# Patient Record
Sex: MALE | Race: WHITE | Employment: OTHER | URBAN - METROPOLITAN AREA
[De-identification: names, ages, dates, MRNs, and addresses within clinical notes are randomized per-mention and may not be internally consistent; named-entity substitution may affect disease eponyms.]

---

## 2017-03-03 RX ORDER — AMLODIPINE BESYLATE 10 MG/1
TABLET ORAL
Qty: 30 TABLET | Refills: 0 | Status: SHIPPED | OUTPATIENT
Start: 2017-03-03 | End: 2017-04-04

## 2017-03-05 RX ORDER — CITALOPRAM 10 MG/1
TABLET ORAL
Qty: 90 TABLET | Refills: 0 | Status: SHIPPED | OUTPATIENT
Start: 2017-03-05 | End: 2017-06-06

## 2017-03-17 NOTE — TELEPHONE ENCOUNTER
Patient contacted and advised to do standing order lab work and schedule an office visit. He is checking with his ride. WILLIAM---please assist patient in scheduling an appointment using SDS or RN approval. He needs a Wednesday and I did offer him 3/22/17.  He

## 2017-03-25 ENCOUNTER — LAB ENCOUNTER (OUTPATIENT)
Dept: LAB | Age: 37
End: 2017-03-25
Attending: INTERNAL MEDICINE
Payer: MEDICARE

## 2017-03-25 DIAGNOSIS — N18.4 CKD (CHRONIC KIDNEY DISEASE), STAGE 4 (SEVERE): ICD-10-CM

## 2017-03-25 LAB
ALBUMIN SERPL BCP-MCNC: 3.6 G/DL (ref 3.5–4.8)
ANION GAP SERPL CALC-SCNC: 11 MMOL/L (ref 0–18)
BASOPHILS # BLD: 0 K/UL (ref 0–0.2)
BASOPHILS NFR BLD: 0 %
BUN SERPL-MCNC: 61 MG/DL (ref 8–20)
BUN/CREAT SERPL: 10.7 (ref 10–20)
CALCIUM SERPL-MCNC: 9.2 MG/DL (ref 8.5–10.5)
CHLORIDE SERPL-SCNC: 106 MMOL/L (ref 95–110)
CO2 SERPL-SCNC: 21 MMOL/L (ref 22–32)
CREAT SERPL-MCNC: 5.7 MG/DL (ref 0.5–1.5)
EOSINOPHIL # BLD: 0.3 K/UL (ref 0–0.7)
EOSINOPHIL NFR BLD: 3 %
ERYTHROCYTE [DISTWIDTH] IN BLOOD BY AUTOMATED COUNT: 13.8 % (ref 11–15)
GLUCOSE SERPL-MCNC: 94 MG/DL (ref 70–99)
HCT VFR BLD AUTO: 35.6 % (ref 41–52)
HGB BLD-MCNC: 11.9 G/DL (ref 13.5–17.5)
LYMPHOCYTES # BLD: 1.8 K/UL (ref 1–4)
LYMPHOCYTES NFR BLD: 16 %
MCH RBC QN AUTO: 30.8 PG (ref 27–32)
MCHC RBC AUTO-ENTMCNC: 33.5 G/DL (ref 32–37)
MCV RBC AUTO: 91.8 FL (ref 80–100)
MONOCYTES # BLD: 1 K/UL (ref 0–1)
MONOCYTES NFR BLD: 9 %
NEUTROPHILS # BLD AUTO: 8 K/UL (ref 1.8–7.7)
NEUTROPHILS NFR BLD: 71 %
OSMOLALITY UR CALC.SUM OF ELEC: 303 MOSM/KG (ref 275–295)
PHOSPHATE SERPL-MCNC: 4.7 MG/DL (ref 2.4–4.7)
PLATELET # BLD AUTO: 245 K/UL (ref 140–400)
PMV BLD AUTO: 7 FL (ref 7.4–10.3)
POTASSIUM SERPL-SCNC: 5.5 MMOL/L (ref 3.3–5.1)
RBC # BLD AUTO: 3.87 M/UL (ref 4.5–5.9)
SODIUM SERPL-SCNC: 138 MMOL/L (ref 136–144)
WBC # BLD AUTO: 11.2 K/UL (ref 4–11)

## 2017-03-25 PROCEDURE — 80069 RENAL FUNCTION PANEL: CPT

## 2017-03-25 PROCEDURE — 36415 COLL VENOUS BLD VENIPUNCTURE: CPT

## 2017-03-25 PROCEDURE — 85025 COMPLETE CBC W/AUTO DIFF WBC: CPT

## 2017-03-29 ENCOUNTER — OFFICE VISIT (OUTPATIENT)
Dept: NEPHROLOGY | Facility: CLINIC | Age: 37
End: 2017-03-29

## 2017-03-29 VITALS
DIASTOLIC BLOOD PRESSURE: 92 MMHG | HEART RATE: 92 BPM | HEIGHT: 70 IN | WEIGHT: 177 LBS | SYSTOLIC BLOOD PRESSURE: 155 MMHG | BODY MASS INDEX: 25.34 KG/M2

## 2017-03-29 DIAGNOSIS — N18.5 CKD (CHRONIC KIDNEY DISEASE), STAGE 5: Primary | ICD-10-CM

## 2017-03-29 DIAGNOSIS — I10 ESSENTIAL HYPERTENSION: ICD-10-CM

## 2017-03-29 PROCEDURE — G0463 HOSPITAL OUTPT CLINIC VISIT: HCPCS | Performed by: INTERNAL MEDICINE

## 2017-03-29 PROCEDURE — 99215 OFFICE O/P EST HI 40 MIN: CPT | Performed by: INTERNAL MEDICINE

## 2017-03-29 RX ORDER — PANTOPRAZOLE SODIUM 40 MG/1
40 TABLET, DELAYED RELEASE ORAL
Qty: 30 TABLET | Refills: 5 | Status: SHIPPED | OUTPATIENT
Start: 2017-03-29 | End: 2017-10-23 | Stop reason: ALTCHOICE

## 2017-03-29 RX ORDER — LORATADINE 10 MG/1
10 TABLET ORAL DAILY
COMMUNITY
End: 2017-10-23 | Stop reason: ALTCHOICE

## 2017-03-29 RX ORDER — AZELASTINE 1 MG/ML
SPRAY, METERED NASAL
COMMUNITY
Start: 2014-06-01 | End: 2017-10-23 | Stop reason: ALTCHOICE

## 2017-04-04 RX ORDER — AMLODIPINE BESYLATE 10 MG/1
TABLET ORAL
Qty: 30 TABLET | Refills: 5 | Status: SHIPPED | OUTPATIENT
Start: 2017-04-04 | End: 2017-10-03

## 2017-04-04 NOTE — PROGRESS NOTES
03/29/2017          Patient:  Des Loja   Date of Birth:  11/6/1980    Date of Visit:  3/29/2017        Dear  Dr. Brittany White,      Thank you for referring Des Loja to my practice.  Please find my assessment and plan below.      As y initiate dialysis if he is unable to complete the transplantation process in a timely fashion.  Strongly advised to follow a low potassium diet.  I reemphasized that he needs to do labs more frequently in order for us to follow him.  Recommended repeating

## 2017-06-06 NOTE — TELEPHONE ENCOUNTER
Refill Protocol Appointment Criteria: Refill protocol failed because the patient did not meet the protocol criteria.  Please advise in regards to refill request     · Appointment scheduled in the past 6 months or in the next 3 months  Recent Visits       Pr

## 2017-06-07 RX ORDER — CITALOPRAM 10 MG/1
TABLET ORAL
Qty: 30 TABLET | Refills: 0 | Status: SHIPPED | OUTPATIENT
Start: 2017-06-07 | End: 2017-07-05

## 2017-06-07 NOTE — TELEPHONE ENCOUNTER
HEIDI MCKEON/[facility] [phone] is calling for status of medication refill request. Per MUKUL pt is out of medication.

## 2017-07-05 RX ORDER — CITALOPRAM 10 MG/1
TABLET ORAL
Qty: 30 TABLET | Refills: 1 | Status: SHIPPED | OUTPATIENT
Start: 2017-07-05 | End: 2017-09-04

## 2017-07-07 NOTE — TELEPHONE ENCOUNTER
Patient is currently seeing specialist, patient will call back to schedule an appointment for august.

## 2017-09-05 RX ORDER — CITALOPRAM 10 MG/1
TABLET ORAL
Qty: 30 TABLET | Refills: 0 | Status: SHIPPED | OUTPATIENT
Start: 2017-09-05 | End: 2017-10-09

## 2017-10-03 DIAGNOSIS — N18.4 CHRONIC KIDNEY DISEASE, STAGE IV (SEVERE) (HCC): Primary | ICD-10-CM

## 2017-10-03 RX ORDER — AMLODIPINE BESYLATE 10 MG/1
TABLET ORAL
Qty: 30 TABLET | Refills: 0 | Status: SHIPPED | OUTPATIENT
Start: 2017-10-03 | End: 2017-11-02

## 2017-10-03 RX ORDER — CITALOPRAM 10 MG/1
TABLET ORAL
Qty: 30 TABLET | Refills: 0 | OUTPATIENT
Start: 2017-10-03

## 2017-10-03 NOTE — TELEPHONE ENCOUNTER
Needs f/u appt.   Refill Protocol Appointment Criteria  · Appointment scheduled in the past 6 months or in the next 3 months  Recent Outpatient Visits            6 months ago CKD (chronic kidney disease), stage 5 [facility])    [address] [place] [address]

## 2017-10-09 RX ORDER — CITALOPRAM 10 MG/1
10 TABLET ORAL
Qty: 30 TABLET | Refills: 0 | Status: SHIPPED | OUTPATIENT
Start: 2017-10-09 | End: 2017-10-23

## 2017-10-09 NOTE — TELEPHONE ENCOUNTER
Pt called to make appt for this Thursday at [address] [place] [address] office at 11:10a. .. please advise

## 2017-10-13 ENCOUNTER — PATIENT OUTREACH (OUTPATIENT)
Dept: FAMILY MEDICINE CLINIC | Facility: CLINIC | Age: 37
End: 2017-10-13

## 2017-10-23 ENCOUNTER — OFFICE VISIT (OUTPATIENT)
Dept: FAMILY MEDICINE CLINIC | Facility: CLINIC | Age: 37
End: 2017-10-23

## 2017-10-23 VITALS
WEIGHT: 171 LBS | SYSTOLIC BLOOD PRESSURE: 131 MMHG | HEIGHT: 70 IN | BODY MASS INDEX: 24.48 KG/M2 | HEART RATE: 94 BPM | DIASTOLIC BLOOD PRESSURE: 85 MMHG

## 2017-10-23 DIAGNOSIS — I10 ESSENTIAL HYPERTENSION: Primary | ICD-10-CM

## 2017-10-23 DIAGNOSIS — N18.4 CHRONIC KIDNEY DISEASE, STAGE IV (SEVERE) (HCC): ICD-10-CM

## 2017-10-23 DIAGNOSIS — F17.200 TOBACCO DEPENDENCE: ICD-10-CM

## 2017-10-23 DIAGNOSIS — Z28.21 IMMUNIZATION NOT CARRIED OUT BECAUSE OF PATIENT REFUSAL: ICD-10-CM

## 2017-10-23 PROCEDURE — 99214 OFFICE O/P EST MOD 30 MIN: CPT | Performed by: FAMILY MEDICINE

## 2017-10-23 PROCEDURE — G0463 HOSPITAL OUTPT CLINIC VISIT: HCPCS | Performed by: FAMILY MEDICINE

## 2017-10-23 RX ORDER — VARENICLINE TARTRATE 1 MG/1
1 TABLET, FILM COATED ORAL 2 TIMES DAILY
Qty: 60 TABLET | Refills: 3 | Status: SHIPPED | OUTPATIENT
Start: 2017-10-23 | End: 2018-03-03

## 2017-10-23 RX ORDER — CITALOPRAM 10 MG/1
10 TABLET ORAL
Qty: 90 TABLET | Refills: 1 | Status: SHIPPED | OUTPATIENT
Start: 2017-10-23 | End: 2018-05-04

## 2017-10-23 NOTE — PROGRESS NOTES
Hasn't gotten on the list for replacement  Multiple calls and hasn't gotten a call back  Stage 5 esrd   No swelling   No encephalopathy  Same vision issues. Tobacco    Needs repeat blood and referral to RAFAELA Daniels living donor.       Patient's past medi Quit on chantix beofre   Recommended quitting prior to transplant.

## 2017-11-02 RX ORDER — AMLODIPINE BESYLATE 10 MG/1
TABLET ORAL
Qty: 30 TABLET | Refills: 0 | Status: SHIPPED | OUTPATIENT
Start: 2017-11-02 | End: 2017-12-03

## 2017-11-06 RX ORDER — CITALOPRAM 10 MG/1
TABLET ORAL
Qty: 30 TABLET | Refills: 0 | OUTPATIENT
Start: 2017-11-06

## 2017-11-06 NOTE — TELEPHONE ENCOUNTER
Refill Protocol Appointment Criteria  · Appointment scheduled in the past 6 months or in the next 3 months  Recent Outpatient Visits            2 weeks ago Essential hypertension    Charlotte Jacobs, Marilyn Santillan, DO    Office V

## 2017-11-07 NOTE — TELEPHONE ENCOUNTER
Patient has not returned our calls. Lab orders printed and mailed to patient with a note attached to schedule an office visit.

## 2017-11-07 NOTE — TELEPHONE ENCOUNTER
Patient has not returned our calls. Lab orders printed and mailed to patient with a note attached to do lab work and schedule an office visit with Dr. Scott Pimentel.

## 2017-12-04 RX ORDER — AMLODIPINE BESYLATE 10 MG/1
TABLET ORAL
Qty: 30 TABLET | Refills: 0 | Status: SHIPPED | OUTPATIENT
Start: 2017-12-04 | End: 2018-01-02

## 2017-12-06 ENCOUNTER — TELEPHONE (OUTPATIENT)
Dept: FAMILY MEDICINE CLINIC | Facility: CLINIC | Age: 37
End: 2017-12-06

## 2017-12-06 NOTE — TELEPHONE ENCOUNTER
Received via fax forms for Chantix, sent by patient. Patient is requesting forms be completed to get Chantix at a lower price. Placed in 's file at [facility].

## 2017-12-07 NOTE — TELEPHONE ENCOUNTER
Form placed on [facility] ADO inbox. Aia 16 is currently out of the office.

## 2017-12-08 NOTE — TELEPHONE ENCOUNTER
Patient contacted. Advised of refill and lab work ordered by Dr. Suma Vargas. Patient is aware to schedule an office visit after lab work is completed.

## 2017-12-12 NOTE — TELEPHONE ENCOUNTER
Forms were completed by [facility], faxed to [phone] by Rosalina Bautista at [facility].

## 2017-12-22 ENCOUNTER — TELEPHONE (OUTPATIENT)
Dept: FAMILY MEDICINE CLINIC | Facility: CLINIC | Age: 37
End: 2017-12-22

## 2017-12-22 NOTE — TELEPHONE ENCOUNTER
Received Chantix 1mg, 56tabs, NDC# [id number],   Lot [id number], exp 02/2020. Placed at [facility]       LM informing patient medication ready for  at [address] [place] [address] [place] office.

## 2018-01-02 RX ORDER — AMLODIPINE BESYLATE 10 MG/1
TABLET ORAL
Qty: 30 TABLET | Refills: 0 | Status: SHIPPED | OUTPATIENT
Start: 2018-01-02 | End: 2018-02-03

## 2018-01-02 NOTE — TELEPHONE ENCOUNTER
Missed order letter with Lab orders enclosed mailed to patient with a note attached to schedule an office visit in order for Dr. Alicia Snow to continue to refill prescriptions.

## 2018-02-05 RX ORDER — AMLODIPINE BESYLATE 10 MG/1
TABLET ORAL
Qty: 14 TABLET | Refills: 0 | Status: SHIPPED | OUTPATIENT
Start: 2018-02-05 | End: 2018-02-17

## 2018-02-05 NOTE — TELEPHONE ENCOUNTER
LOV 3/29/17. No upcoming appts scheduled. Pt has been contacted to schedule an appt and also mailed a letter last month. 2 week supply of med pended.

## 2018-02-19 RX ORDER — AMLODIPINE BESYLATE 10 MG/1
TABLET ORAL
Qty: 14 TABLET | Refills: 0 | Status: SHIPPED | OUTPATIENT
Start: 2018-02-19 | End: 2018-03-03

## 2018-02-19 NOTE — TELEPHONE ENCOUNTER
LOV 3/29/17. No upcoming appts scheduled. I left him a message on 2/5/18 reminding him to do labs as ordered by MKK and to schedule an appointment. A 2 week supply was sent on 2/5/18 and patient has yet to comply and make an appointment or do his labs.

## 2018-02-20 NOTE — TELEPHONE ENCOUNTER
Contacted pt. Notified him MKK needs to see him for follow up and to do labs as ordered. He scheduled follow up for 3/20/18.

## 2018-03-02 NOTE — TELEPHONE ENCOUNTER
Pt requesting refill   Pt would like call back when ready for   Varenicline Tartrate (CHANTIX) 1 MG Oral Tab

## 2018-03-03 NOTE — TELEPHONE ENCOUNTER
[facility]: please advise on further refills.      Refill Protocol Appointment Criteria  · Appointment scheduled in the past 6 months or in the next 3 months  Recent Outpatient Visits            4 months ago Essential hypertension    [address]

## 2018-03-05 RX ORDER — AMLODIPINE BESYLATE 10 MG/1
TABLET ORAL
Qty: 30 TABLET | Refills: 0 | Status: SHIPPED | OUTPATIENT
Start: 2018-03-05 | End: 2018-04-02

## 2018-03-06 RX ORDER — VARENICLINE TARTRATE 1 MG/1
1 TABLET, FILM COATED ORAL 2 TIMES DAILY
Qty: 60 TABLET | Refills: 3 | Status: ON HOLD | OUTPATIENT
Start: 2018-03-06 | End: 2018-04-30

## 2018-04-02 RX ORDER — AMLODIPINE BESYLATE 10 MG/1
TABLET ORAL
Qty: 30 TABLET | Refills: 0 | Status: ON HOLD | OUTPATIENT
Start: 2018-04-02 | End: 2018-05-02

## 2018-04-02 NOTE — TELEPHONE ENCOUNTER
LOV 3/29/17. Patient cancelled 3/20/18 appointment and has not rescheduled. Refill pended and routed to Dr. Keisha Higgins to approve.

## 2018-04-03 NOTE — TELEPHONE ENCOUNTER
Left pt message that [facility]KK sent #30 of his medication but he will need to be seen for further refills since now it's been > 1 year since he was last seen.  Advised him to do labs and make appt ASAP or to contact PCP to see if Dr. Casimiro Villa is willing to take o

## 2018-04-08 ENCOUNTER — HOSPITAL ENCOUNTER (OUTPATIENT)
Age: 38
Discharge: HOME | End: 2018-04-08
Attending: EMERGENCY MEDICINE
Payer: MEDICARE

## 2018-04-08 VITALS
WEIGHT: 175 LBS | HEIGHT: 70 IN | SYSTOLIC BLOOD PRESSURE: 142 MMHG | RESPIRATION RATE: 18 BRPM | BODY MASS INDEX: 25.05 KG/M2 | TEMPERATURE: 98 F | HEART RATE: 99 BPM | DIASTOLIC BLOOD PRESSURE: 87 MMHG | OXYGEN SATURATION: 100 %

## 2018-04-08 DIAGNOSIS — J01.91 ACUTE RECURRENT SINUSITIS, UNSPECIFIED LOCATION: Primary | ICD-10-CM

## 2018-04-08 PROCEDURE — 99204 OFFICE O/P NEW MOD 45 MIN: CPT

## 2018-04-08 PROCEDURE — 99213 OFFICE O/P EST LOW 20 MIN: CPT

## 2018-04-08 RX ORDER — PREDNISONE 20 MG/1
40 TABLET ORAL DAILY
Qty: 8 TABLET | Refills: 0 | Status: SHIPPED | OUTPATIENT
Start: 2018-04-08 | End: 2018-04-12

## 2018-04-08 RX ORDER — AMOXICILLIN AND CLAVULANATE POTASSIUM 875; 125 MG/1; MG/1
1 TABLET, FILM COATED ORAL 2 TIMES DAILY
Qty: 14 TABLET | Refills: 0 | Status: SHIPPED | OUTPATIENT
Start: 2018-04-08 | End: 2018-04-15

## 2018-04-08 NOTE — ED PROVIDER NOTES
Patient Seen in: [address]    History   Patient presents with:  Sinus Problem    Stated Complaint: sinus    HPI    Patient is a 26-year-old male that complains of 6 weeks of sinus congestion and pressure.   He states it i SpO2 100%   BMI 25.11 kg/m²         Physical Exam    Constitutional: Oriented to person, place, and time. Appears well-developed and well-nourished. HEENT:   Head: Normocephalic and atraumatic.    Right Ear: External ear normal.  TM is dull  Left Ear: Ext 875-125 MG Oral Tab  Take 1 tablet by mouth 2 (two) times daily. Qty: 14 tablet Refills: 0    predniSONE 20 MG Oral Tab  Take 2 tablets (40 mg total) by mouth daily.   Qty: 8 tablet Refills: 0

## 2018-04-21 ENCOUNTER — LAB ENCOUNTER (OUTPATIENT)
Dept: LAB | Age: 38
End: 2018-04-21
Attending: INTERNAL MEDICINE
Payer: MEDICARE

## 2018-04-21 ENCOUNTER — TELEPHONE (OUTPATIENT)
Dept: NEPHROLOGY | Facility: CLINIC | Age: 38
End: 2018-04-21

## 2018-04-21 DIAGNOSIS — N18.4 CHRONIC KIDNEY DISEASE, STAGE IV (SEVERE) (HCC): ICD-10-CM

## 2018-04-21 PROCEDURE — 85025 COMPLETE CBC W/AUTO DIFF WBC: CPT

## 2018-04-21 PROCEDURE — 36415 COLL VENOUS BLD VENIPUNCTURE: CPT

## 2018-04-21 PROCEDURE — 83970 ASSAY OF PARATHORMONE: CPT

## 2018-04-21 PROCEDURE — 80069 RENAL FUNCTION PANEL: CPT

## 2018-04-21 NOTE — TELEPHONE ENCOUNTER
Received a call from lab for critical value. His BUN/creatinine noted 81/10.1 mg/dl. Potassium 5.2. Denies any nausea, fatigue, sob. Good appetite.  No OTC NSIADs or urinary retention    State cannot come to ED today and will come tomm

## 2018-04-22 ENCOUNTER — TELEPHONE (OUTPATIENT)
Dept: NEPHROLOGY | Facility: CLINIC | Age: 38
End: 2018-04-22

## 2018-04-22 NOTE — TELEPHONE ENCOUNTER
Kidney function has gotten much worse. Needs to be seen for follow-up ASAP. If he feels bad send to the ER.

## 2018-04-23 NOTE — TELEPHONE ENCOUNTER
Contacted pt. Notified him that his kidney function has gotten much worse and he needs to be seen ASAP for a follow up or proceed to ER if he isn't feeling well. He states he's been feeling fine.  Offered appt for tomorrow at 4:20 PM but he states his wife

## 2018-04-27 ENCOUNTER — OFFICE VISIT (OUTPATIENT)
Dept: NEPHROLOGY | Facility: CLINIC | Age: 38
End: 2018-04-27

## 2018-04-27 ENCOUNTER — LAB ENCOUNTER (OUTPATIENT)
Dept: LAB | Facility: HOSPITAL | Age: 38
End: 2018-04-27
Attending: INTERNAL MEDICINE
Payer: MEDICARE

## 2018-04-27 VITALS
SYSTOLIC BLOOD PRESSURE: 135 MMHG | DIASTOLIC BLOOD PRESSURE: 83 MMHG | HEIGHT: 70 IN | WEIGHT: 169.63 LBS | BODY MASS INDEX: 24.28 KG/M2 | HEART RATE: 81 BPM

## 2018-04-27 DIAGNOSIS — D63.1 ANEMIA DUE TO CHRONIC KIDNEY DISEASE: ICD-10-CM

## 2018-04-27 DIAGNOSIS — N18.5 CKD (CHRONIC KIDNEY DISEASE) STAGE 5, GFR LESS THAN 15 ML/MIN (HCC): Primary | ICD-10-CM

## 2018-04-27 DIAGNOSIS — N18.5 CKD (CHRONIC KIDNEY DISEASE) STAGE 5, GFR LESS THAN 15 ML/MIN (HCC): ICD-10-CM

## 2018-04-27 DIAGNOSIS — I10 ESSENTIAL HYPERTENSION: ICD-10-CM

## 2018-04-27 DIAGNOSIS — N18.9 ANEMIA DUE TO CHRONIC KIDNEY DISEASE: ICD-10-CM

## 2018-04-27 PROCEDURE — 80069 RENAL FUNCTION PANEL: CPT

## 2018-04-27 PROCEDURE — 85025 COMPLETE CBC W/AUTO DIFF WBC: CPT

## 2018-04-27 PROCEDURE — 84466 ASSAY OF TRANSFERRIN: CPT

## 2018-04-27 PROCEDURE — 80500 HEPATITIS B PROFILE: CPT

## 2018-04-27 PROCEDURE — 36415 COLL VENOUS BLD VENIPUNCTURE: CPT

## 2018-04-27 PROCEDURE — 83540 ASSAY OF IRON: CPT

## 2018-04-27 PROCEDURE — 99215 OFFICE O/P EST HI 40 MIN: CPT | Performed by: INTERNAL MEDICINE

## 2018-04-27 PROCEDURE — 82728 ASSAY OF FERRITIN: CPT

## 2018-04-27 PROCEDURE — 86704 HEP B CORE ANTIBODY TOTAL: CPT

## 2018-04-27 PROCEDURE — 86706 HEP B SURFACE ANTIBODY: CPT

## 2018-04-27 PROCEDURE — 87340 HEPATITIS B SURFACE AG IA: CPT

## 2018-04-27 PROCEDURE — G0463 HOSPITAL OUTPT CLINIC VISIT: HCPCS | Performed by: INTERNAL MEDICINE

## 2018-04-27 NOTE — PROGRESS NOTES
04/27/18        Patient: Isaiah Doll   YOB: 1980   Date of Visit: 4/27/2018       Dear  Dr. Jose Clements,      Thank you for referring Isaiah Doll to my practice. Please find my assessment and plan below.       As you know he future but realistically this still could take 2-3 months. Therefore I recommended hospital admission to initiate chronic dialytic therapy. He wanted me to repeat the laboratory studies which we will do today to confirm these laboratory abnormalities.   I

## 2018-04-30 ENCOUNTER — HOSPITAL ENCOUNTER (INPATIENT)
Facility: HOSPITAL | Age: 38
LOS: 2 days | Discharge: HOME | DRG: 673 | End: 2018-05-02
Attending: HOSPITALIST | Admitting: HOSPITALIST
Payer: MEDICARE

## 2018-04-30 ENCOUNTER — APPOINTMENT (OUTPATIENT)
Dept: INTERVENTIONAL RADIOLOGY/VASCULAR | Facility: HOSPITAL | Age: 38
DRG: 673 | End: 2018-04-30
Attending: INTERNAL MEDICINE
Payer: MEDICARE

## 2018-04-30 PROBLEM — N18.9 CKD (CHRONIC KIDNEY DISEASE): Status: ACTIVE | Noted: 2018-04-30

## 2018-04-30 PROCEDURE — 0JH63XZ INSERTION OF TUNNELED VASCULAR ACCESS DEVICE INTO CHEST SUBCUTANEOUS TISSUE AND FASCIA, PERCUTANEOUS APPROACH: ICD-10-PCS | Performed by: RADIOLOGY

## 2018-04-30 PROCEDURE — 02H633Z INSERTION OF INFUSION DEVICE INTO RIGHT ATRIUM, PERCUTANEOUS APPROACH: ICD-10-PCS | Performed by: RADIOLOGY

## 2018-04-30 PROCEDURE — 99222 1ST HOSP IP/OBS MODERATE 55: CPT | Performed by: HOSPITALIST

## 2018-04-30 PROCEDURE — 99223 1ST HOSP IP/OBS HIGH 75: CPT | Performed by: INTERNAL MEDICINE

## 2018-04-30 PROCEDURE — 5A1D70Z PERFORMANCE OF URINARY FILTRATION, INTERMITTENT, LESS THAN 6 HOURS PER DAY: ICD-10-PCS | Performed by: HOSPITALIST

## 2018-04-30 RX ORDER — SODIUM CHLORIDE 9 MG/ML
INJECTION, SOLUTION INTRAVENOUS
Status: DISPENSED
Start: 2018-04-30 | End: 2018-05-01

## 2018-04-30 RX ORDER — AMLODIPINE BESYLATE 10 MG/1
10 TABLET ORAL
Status: DISCONTINUED | OUTPATIENT
Start: 2018-05-01 | End: 2018-05-02

## 2018-04-30 RX ORDER — HEPARIN SODIUM 1000 [USP'U]/ML
INJECTION, SOLUTION INTRAVENOUS; SUBCUTANEOUS
Status: DISCONTINUED
Start: 2018-04-30 | End: 2018-04-30 | Stop reason: WASHOUT

## 2018-04-30 RX ORDER — 0.9 % SODIUM CHLORIDE 0.9 %
VIAL (ML) INJECTION
Status: DISPENSED
Start: 2018-04-30 | End: 2018-05-01

## 2018-04-30 RX ORDER — CEFAZOLIN SODIUM/WATER 2 G/20 ML
SYRINGE (ML) INTRAVENOUS
Status: DISCONTINUED
Start: 2018-04-30 | End: 2018-04-30 | Stop reason: WASHOUT

## 2018-04-30 RX ORDER — ONDANSETRON 2 MG/ML
4 INJECTION INTRAMUSCULAR; INTRAVENOUS EVERY 6 HOURS PRN
Status: DISCONTINUED | OUTPATIENT
Start: 2018-04-30 | End: 2018-05-02

## 2018-04-30 RX ORDER — ACETAMINOPHEN 325 MG/1
650 TABLET ORAL EVERY 6 HOURS PRN
Status: DISCONTINUED | OUTPATIENT
Start: 2018-04-30 | End: 2018-05-02

## 2018-04-30 RX ORDER — SODIUM CHLORIDE 9 MG/ML
INJECTION, SOLUTION INTRAVENOUS
Status: DISCONTINUED
Start: 2018-04-30 | End: 2018-04-30 | Stop reason: WASHOUT

## 2018-04-30 RX ORDER — MIDAZOLAM HYDROCHLORIDE 1 MG/ML
INJECTION INTRAMUSCULAR; INTRAVENOUS
Status: DISCONTINUED
Start: 2018-04-30 | End: 2018-04-30 | Stop reason: WASHOUT

## 2018-04-30 RX ORDER — SODIUM CHLORIDE 0.9 % (FLUSH) 0.9 %
3 SYRINGE (ML) INJECTION AS NEEDED
Status: DISCONTINUED | OUTPATIENT
Start: 2018-04-30 | End: 2018-05-02

## 2018-04-30 RX ORDER — HEPARIN SODIUM 1000 [USP'U]/ML
INJECTION, SOLUTION INTRAVENOUS; SUBCUTANEOUS
Status: DISPENSED
Start: 2018-04-30 | End: 2018-05-01

## 2018-04-30 RX ORDER — CITALOPRAM 20 MG/1
10 TABLET ORAL
Status: DISCONTINUED | OUTPATIENT
Start: 2018-05-01 | End: 2018-05-02

## 2018-04-30 RX ORDER — LIDOCAINE HYDROCHLORIDE 20 MG/ML
INJECTION, SOLUTION EPIDURAL; INFILTRATION; INTRACAUDAL; PERINEURAL
Status: DISCONTINUED
Start: 2018-04-30 | End: 2018-04-30 | Stop reason: WASHOUT

## 2018-04-30 RX ORDER — SODIUM CHLORIDE 9 MG/ML
INJECTION, SOLUTION INTRAVENOUS
Status: COMPLETED
Start: 2018-04-30 | End: 2018-04-30

## 2018-04-30 NOTE — PROGRESS NOTES
Dreama Gaucher  [id number]  [unfilled]    . Patient's vital signs stable, access site dry and intact, no signs and symptoms of bleeding/ hematoma.     Leyda Amador RN

## 2018-04-30 NOTE — PROGRESS NOTES
Anne-Marie Wasserman  [id number]      Post procedure/ recovery hand-off report given to [facility]. Patient's vital signs are stable. Procedural access site is  dry and intact with no signs and symptoms of bleeding/ hematoma.     Anny Burns RN

## 2018-04-30 NOTE — H&P
[address] [place] [address] Patient Status:  Inpatient    1980 MRN [id number]   Location [address] [facility] Attending Maryln Denver, [address] [facility] Day # 0 CHIARA Flood.  DO Essence     Date:  2018 Paternal Grandfather    • Alcohol and Other Disorders Associated Paternal Grandfather      Alcoholism   • Alcohol and Other Disorders Associated Sister    • Psychiatric Sister      Psychological   • Kidney Disease Sister    • Hearing deficiency [OTHER] [de-identified] sinus tenderness. Throat: lips, mucosa, and tongue normal; teeth and gums normal  Back: symmetric, no curvature. ROM normal. No CVA tenderness.   Pulmonary:  clear to auscultation bilaterally  Cardiovascular: S1, S2 normal, no murmur, click, rub or gallop, discussing plan of care. All questions answered.        Deanna Bush, DO  4/45/0859  **Certification      PHYSICIAN Certification of Need for Inpatient Hospitalization - Initial Certification    Patient will require inpatient services that will reasonabl

## 2018-05-01 PROCEDURE — 99232 SBSQ HOSP IP/OBS MODERATE 35: CPT | Performed by: HOSPITALIST

## 2018-05-01 PROCEDURE — 99233 SBSQ HOSP IP/OBS HIGH 50: CPT | Performed by: INTERNAL MEDICINE

## 2018-05-01 RX ORDER — SODIUM CHLORIDE 9 MG/ML
INJECTION, SOLUTION INTRAVENOUS
Status: COMPLETED
Start: 2018-05-01 | End: 2018-05-01

## 2018-05-01 RX ORDER — MAGNESIUM HYDROXIDE/ALUMINUM HYDROXICE/SIMETHICONE 120; 1200; 1200 MG/30ML; MG/30ML; MG/30ML
30 SUSPENSION ORAL 4 TIMES DAILY PRN
Status: DISCONTINUED | OUTPATIENT
Start: 2018-05-01 | End: 2018-05-02

## 2018-05-01 NOTE — CM/SW NOTE
SW received MD order for outpatient HD. SW met w/ pt to discuss eventual discharge needs. Pt lives at home w/ his supportive wife in [place]. Pt lives in a 1 level raised ranch house w/ 3 steps to enter.  Pt reports to be independent w/ all ADL's, but

## 2018-05-01 NOTE — PROGRESS NOTES
[facility]    Progress Note    Madison Sharlene Patient Status:  Inpatient    1980 MRN [id number]   Location [address] [facility] Attending Marisa Screen, [address] [facility] Day # 1 PCP Prisca Singletary.  Tea Milian DO       Subjective:   Aimee Perez 05/01/2018   PTT 33.7 09/23/2013   INR 1.0 04/30/2018   PT 12.7 09/23/2013   TSH 2.23 10/27/2011   ESRML 16 (H) 04/05/2013   CRP 1.6 (H) 09/23/2013   MG 2.1 05/01/2018   PHOS 4.2 05/01/2018   OWN NEGATIVE 04/05/2013       Ir Tunneled Cv Cath Insertion Exch

## 2018-05-01 NOTE — PROGRESS NOTES
[facility]  Nephrology Daily Progress Note    Carmen Farley  [id number]  44year old      HPI:   Carmen Farley is a 44year old male. Had nausea this am but better with Zofran.        ROS:     Constitutional:  Negative for dec Results  Component Value Date   WBC 9.4 05/01/2018   HGB 10.6 05/01/2018   HCT 30.0 05/01/2018    05/01/2018   CREATSERUM 6.46 05/01/2018   BUN 32 05/01/2018    05/01/2018   K 4.9 05/01/2018    05/01/2018   CO2 25 05/01/2018    05 Patient Active Problem List:     Optic neuritis     Abnormal finding on MRI of brain     Chronic kidney disease, stage IV (severe) (HCC)     HTN (hypertension)     Anemia due to chronic kidney disease     CKD (chronic kidney disease)     ESRD needing kelsey

## 2018-05-01 NOTE — PLAN OF CARE
Problem: Patient/Family Goals  Goal: Patient/Family Long Term Goal  Patient's Long Term Goal: to be discharge home     Interventions:    - follow doctors recommendations    - See additional Care Plan goals for specific interventions    Outcome: Progressing and delivery of care  - Encourage patient/family to participate in care and decision-making at the level they choose  - Honor patient and family perspectives and choices   Outcome: Progressing

## 2018-05-01 NOTE — CONSULTS
[facility]    PATIENT'S NAME: Rosey Robison   ATTENDING PHYSICIAN: Ping Esqueda DO   CONSULTING PHYSICIAN: Geovanna Melissa MD   PATIENT ACCOUNT#:   [de-identified]    LOCATION:  [address] RECORD #:   [id number]       DATE OF BIR agreeable to being admitted today to initiate this process. PAST MEDICAL HISTORY:  Significant for chronic kidney disease, stage 5, secondary to biopsy-proven hypertension and nephrosclerosis.   He does have a history of hypertension and anemia of chroni now needs to start chronic hemodialysis. The patient does have early uremic symptoms. 2.   Hypertension. 3.   Anemia of chronic disease. 4.   Unexplained visual loss. 5.  Depression    PLAN:  A PermCath will be placed today.   Interventional Radiology

## 2018-05-02 ENCOUNTER — TELEPHONE (OUTPATIENT)
Dept: NEPHROLOGY | Facility: CLINIC | Age: 38
End: 2018-05-02

## 2018-05-02 VITALS
WEIGHT: 157.63 LBS | RESPIRATION RATE: 18 BRPM | BODY MASS INDEX: 22.57 KG/M2 | DIASTOLIC BLOOD PRESSURE: 100 MMHG | HEART RATE: 87 BPM | SYSTOLIC BLOOD PRESSURE: 146 MMHG | HEIGHT: 70 IN | OXYGEN SATURATION: 98 % | TEMPERATURE: 99 F

## 2018-05-02 PROCEDURE — 99239 HOSP IP/OBS DSCHRG MGMT >30: CPT | Performed by: HOSPITALIST

## 2018-05-02 PROCEDURE — 99232 SBSQ HOSP IP/OBS MODERATE 35: CPT | Performed by: INTERNAL MEDICINE

## 2018-05-02 RX ORDER — AMLODIPINE BESYLATE 10 MG/1
10 TABLET ORAL
Qty: 30 TABLET | Refills: 0 | Status: SHIPPED | OUTPATIENT
Start: 2018-05-03

## 2018-05-02 NOTE — PLAN OF CARE
Problem: Patient/Family Goals  Goal: Patient/Family Long Term Goal  Patient's Long Term Goal: to be discharge home, outpatient hemodialysis    Interventions:    - follow doctors recommendations    - See additional Care Plan goals for specific interventions beliefs, and cultural backgrounds into the planning and delivery of care  - Encourage patient/family to participate in care and decision-making at the level they choose  - Honor patient and family perspectives and choices   Outcome: Progressing    Patient

## 2018-05-02 NOTE — PLAN OF CARE
Problem: Patient/Family Goals  Goal: Patient/Family Long Term Goal  Patient's Long Term Goal: to be discharge home, outpatient hemodialysis    Interventions:    - follow doctors recommendations    - See additional Care Plan goals for specific interventions

## 2018-05-02 NOTE — DISCHARGE SUMMARY
More than 30 min spent on dc  Dc summary # [id number]  Hospital Discharge Diagnoses: esrd    Lace+ Score: 11  59-90 High Risk  29-58 Medium Risk  0-28   Low Risk. TCM Follow-Up Recommendation:  LACE 29-58:  Moderate Risk of readmission after discharge from

## 2018-05-02 NOTE — CM/SW NOTE
MELISSA spoke w/ Jhoana Wilkerson from [address] [place] [address] and confirmed that they have pt scheduled for TTSa at 1:30pm. SW gave pt a letter w/ pt's schedule. Pt inquired about FMLA paperwork for his wife.  MELISSA explained that FMLA forms are to be completed and signed by P

## 2018-05-02 NOTE — PROGRESS NOTES
[facility]  Nephrology Daily Progress Note    Pj Wilburn  [id number]  44year old      HPI:   Pj Wilburn is a 44year old male. Had some nausea after HD yesterday but overall energy is much better.        ROS:     Constitu appropriate for age reflexes and motor skills appropriate for age    Labs:    Lab Results  Component Value Date   WBC 13.3 05/02/2018   HGB 11.3 05/02/2018   HCT 31.8 05/02/2018    05/02/2018   CREATSERUM 5.68 05/02/2018   BUN 22 05/02/2018    or output data in the 24 hours ending 05/02/18 [id number]       ASSESSMENT/PLAN:   Assessment   Patient Active Problem List:     Optic neuritis     Abnormal finding on MRI of brain     Chronic kidney disease, stage IV (severe) (HCC)     HTN (hypertension)     An

## 2018-05-03 ENCOUNTER — TELEPHONE (OUTPATIENT)
Dept: FAMILY MEDICINE CLINIC | Facility: CLINIC | Age: 38
End: 2018-05-03

## 2018-05-03 RX ORDER — AMLODIPINE BESYLATE 10 MG/1
TABLET ORAL
Qty: 30 TABLET | Refills: 5 | Status: SHIPPED | OUTPATIENT
Start: 2018-05-03 | End: 2018-05-07 | Stop reason: ALTCHOICE

## 2018-05-03 NOTE — TELEPHONE ENCOUNTER
FMLA form for spouse given by [facility] nurse. Logged for processing in [employer]. Pt has follow up visit on 5/7/18 and can sign HIPAA and pay fee at that time. **On 5/7/18 - Pt signed HIPAA and pd fee.

## 2018-05-03 NOTE — TELEPHONE ENCOUNTER
Pt was called to make hospital follow up appt.  [facility] schedule for 5/7/18 at 1240pm.

## 2018-05-03 NOTE — DISCHARGE SUMMARY
[facility]    PATIENT'S NAME: Jasiel Gaviriarafaela WAGNER   ATTENDING PHYSICIAN: Radha Malhotra MD   PATIENT ACCOUNT#:   [id number]    LOCATION:  2X [address] RECORD #:   [id number]       YOB: 1980  ADMISSION DATE:       04/3 Hypertension. 6.   Depression. 7.   DVT prophylaxis. CONDITION UPON DISCHARGE:  Stable. CODE STATUS:  Full Code while he is here. DISCHARGE ACTIVITY:  As tolerated. DISCHARGE FOLLOWUP:   [facility], Dr. Valentina Marte.   Follow up with

## 2018-05-07 ENCOUNTER — OFFICE VISIT (OUTPATIENT)
Dept: FAMILY MEDICINE CLINIC | Facility: CLINIC | Age: 38
End: 2018-05-07

## 2018-05-07 VITALS
HEIGHT: 70 IN | BODY MASS INDEX: 23.62 KG/M2 | SYSTOLIC BLOOD PRESSURE: 110 MMHG | WEIGHT: 165 LBS | HEART RATE: 75 BPM | DIASTOLIC BLOOD PRESSURE: 63 MMHG

## 2018-05-07 DIAGNOSIS — I10 ESSENTIAL HYPERTENSION: ICD-10-CM

## 2018-05-07 DIAGNOSIS — F17.200 TOBACCO DEPENDENCE: ICD-10-CM

## 2018-05-07 DIAGNOSIS — Z99.2 ESRD ON HEMODIALYSIS (HCC): ICD-10-CM

## 2018-05-07 DIAGNOSIS — N18.9 ANEMIA DUE TO CHRONIC KIDNEY DISEASE: ICD-10-CM

## 2018-05-07 DIAGNOSIS — D63.1 ANEMIA DUE TO CHRONIC KIDNEY DISEASE: ICD-10-CM

## 2018-05-07 DIAGNOSIS — N18.6 ESRD ON HEMODIALYSIS (HCC): ICD-10-CM

## 2018-05-07 DIAGNOSIS — N18.4 CHRONIC KIDNEY DISEASE, STAGE IV (SEVERE) (HCC): Primary | ICD-10-CM

## 2018-05-07 PROCEDURE — 1111F DSCHRG MED/CURRENT MED MERGE: CPT | Performed by: FAMILY MEDICINE

## 2018-05-07 PROCEDURE — 99495 TRANSJ CARE MGMT MOD F2F 14D: CPT | Performed by: FAMILY MEDICINE

## 2018-05-07 RX ORDER — CITALOPRAM 10 MG/1
TABLET ORAL
Qty: 90 TABLET | Refills: 0 | Status: SHIPPED | OUTPATIENT
Start: 2018-05-07 | End: 2018-08-07

## 2018-05-07 NOTE — TELEPHONE ENCOUNTER
for Psychiatric Non-Scheduled (Anti-Anxiety)  Refill Protocol Appointment Criteria  · Appointment scheduled in the past 6 months or in the next 3 months  Recent Outpatient Visits            1 week ago CKD (chronic kidney disease) stage 5, GFR less than 15

## 2018-05-07 NOTE — TELEPHONE ENCOUNTER
Dr. Pam Serrano for wife pending in [employer]. Wife is requesting intermittent time of 1-3 times per week for 12 months starting today to take pt to dialysis and to care for spouse. Do you approve? Please advise.     Thank you,  [facility]

## 2018-05-07 NOTE — TELEPHONE ENCOUNTER
Dr. Paula Grossman,    Please sign off on form:  -Highlight the patient and hit \"Chart\" button. -In Chart Review, w/in the Encounter tab - click 1 time on the Telephone call encounter for 5/3/18.  Scroll down the telephone encounter.  -Click \"scan on\" blue

## 2018-05-07 NOTE — PROGRESS NOTES
I've already met the surgeon and the Herson Alva the coordinator at [facility]. Was just down there Friday. \"I don't want the arm one (fistula) cause i'm gonna get a new kidney. \"    No leg swelling  Appetite has been ok. I can't stop eating.     Dialysis at [facility] 4/30/2018  Discharge Date: 5/2/2018  Hospital Discharge Diagnosis:    ESRD    Interactive contact within 2 business days post discharge first initiated on Date: yes    During the visit, the following was completed:  ?  Obtained and reviewed discharge inform (2011); and other surgical history.     He family history includes Alcohol and Other Disorders Associated in his paternal grandfather and sister; Cancer in his mother; Diabetes in his mother and paternal grandfather; Hearing deficiency in an other family me

## 2018-05-08 NOTE — TELEPHONE ENCOUNTER
Left message on Spouse - Kera's phone that her FMLA is ready for a  at [facility] office. Did not fax anywhere b/c she needs to add addtl documents to [facility].

## 2018-05-31 NOTE — TELEPHONE ENCOUNTER
Pt's wife picked up LA.  Gave copies and the faxed facesheet from Hospital that came with the [facility] request.

## 2018-08-07 RX ORDER — CITALOPRAM 10 MG/1
TABLET ORAL
Qty: 90 TABLET | Refills: 0 | Status: SHIPPED | OUTPATIENT
Start: 2018-08-07 | End: 2018-11-10

## 2018-08-08 NOTE — TELEPHONE ENCOUNTER
Refill Protocol Appointment Criteria  · Appointment scheduled in the past 6 months or in the next 3 months  Recent Outpatient Visits            3 months [facility]

## 2018-10-08 ENCOUNTER — TELEPHONE (OUTPATIENT)
Dept: OTHER | Age: 38
End: 2018-10-08

## 2018-10-08 NOTE — TELEPHONE ENCOUNTER
Action Requested: Summary for Provider     []  Critical Lab, Recommendations Needed  [] Need Additional Advice  []   FYI    []   Need Orders  [] Need Medications Sent to Pharmacy  []  Other     SUMMARY:  OV scheduled with Dr Mirian Yu for tomorrow 10

## 2018-10-09 ENCOUNTER — OFFICE VISIT (OUTPATIENT)
Dept: FAMILY MEDICINE CLINIC | Facility: CLINIC | Age: 38
End: 2018-10-09
Payer: COMMERCIAL

## 2018-10-09 ENCOUNTER — HOSPITAL ENCOUNTER (OUTPATIENT)
Dept: GENERAL RADIOLOGY | Age: 38
Discharge: HOME | End: 2018-10-09
Attending: FAMILY MEDICINE
Payer: COMMERCIAL

## 2018-10-09 VITALS
SYSTOLIC BLOOD PRESSURE: 129 MMHG | DIASTOLIC BLOOD PRESSURE: 78 MMHG | TEMPERATURE: 98 F | WEIGHT: 176 LBS | BODY MASS INDEX: 25 KG/M2 | HEART RATE: 94 BPM

## 2018-10-09 DIAGNOSIS — R05.9 COUGH: Primary | ICD-10-CM

## 2018-10-09 DIAGNOSIS — R05.9 COUGH: ICD-10-CM

## 2018-10-09 PROCEDURE — 94640 AIRWAY INHALATION TREATMENT: CPT | Performed by: FAMILY MEDICINE

## 2018-10-09 PROCEDURE — 71046 X-RAY EXAM CHEST 2 VIEWS: CPT | Performed by: FAMILY MEDICINE

## 2018-10-09 PROCEDURE — 99212 OFFICE O/P EST SF 10 MIN: CPT | Performed by: FAMILY MEDICINE

## 2018-10-09 PROCEDURE — 99214 OFFICE O/P EST MOD 30 MIN: CPT | Performed by: FAMILY MEDICINE

## 2018-10-09 PROCEDURE — 99070 SPECIAL SUPPLIES PHYS/QHP: CPT | Performed by: FAMILY MEDICINE

## 2018-10-09 RX ORDER — DOXYCYCLINE HYCLATE 50 MG/1
1-2 TABLET, DELAYED RELEASE ORAL 2 TIMES DAILY
Qty: 22 TABLET | Refills: 0 | Status: SHIPPED | OUTPATIENT
Start: 2018-10-09 | End: 2018-10-19

## 2018-10-09 RX ORDER — ALBUTEROL SULFATE 90 UG/1
2 AEROSOL, METERED RESPIRATORY (INHALATION) EVERY 4 HOURS PRN
Qty: 1 INHALER | Refills: 3 | Status: SHIPPED | OUTPATIENT
Start: 2018-10-09 | End: 2019-10-09

## 2018-10-09 RX ORDER — CODEINE PHOSPHATE AND GUAIFENESIN 10; 100 MG/5ML; MG/5ML
10 SOLUTION ORAL 4 TIMES DAILY PRN
Qty: 180 ML | Refills: 0 | Status: SHIPPED | OUTPATIENT
Start: 2018-10-09 | End: 2019-03-07

## 2018-10-09 RX ORDER — ALBUTEROL SULFATE 2.5 MG/3ML
2.5 SOLUTION RESPIRATORY (INHALATION) ONCE
Status: COMPLETED | OUTPATIENT
Start: 2018-10-09 | End: 2018-10-09

## 2018-10-09 RX ORDER — SEVELAMER CARBONATE 800 MG/1
800 TABLET, FILM COATED ORAL 3 TIMES DAILY
COMMUNITY
End: 2019-03-07

## 2018-10-09 RX ADMIN — ALBUTEROL SULFATE 2.5 MG: 2.5 SOLUTION RESPIRATORY (INHALATION) at 14:01:00

## 2018-10-09 NOTE — PROGRESS NOTES
Pt waiting for donor  For new kidney  Maybe a cousin     On 3 day a week reg dialysis  [facility]    On disability for vision issues.     Got flu shot 10 days ago   First 3-4 days I had the flu  Then I have had cough and congestionin upper chest.    Exam    Patien

## 2018-10-18 ENCOUNTER — TELEPHONE (OUTPATIENT)
Dept: FAMILY MEDICINE CLINIC | Facility: CLINIC | Age: 38
End: 2018-10-18

## 2018-10-18 NOTE — TELEPHONE ENCOUNTER
Patient returned call advised of Dr MORGAN GOMEZ [facility] AT [facility] result note below, with understanding. No other concerns at this time.

## 2018-11-12 RX ORDER — CITALOPRAM HYDROBROMIDE 10 MG/1
TABLET ORAL
Qty: 90 TABLET | Refills: 0 | Status: SHIPPED | OUTPATIENT
Start: 2018-11-12 | End: 2019-03-07

## 2019-02-20 RX ORDER — CITALOPRAM HYDROBROMIDE 10 MG/1
TABLET ORAL
Qty: 90 TABLET | Refills: 0 | Status: SHIPPED | OUTPATIENT
Start: 2019-02-20 | End: 2019-03-07

## 2019-03-04 ENCOUNTER — TELEPHONE (OUTPATIENT)
Dept: OTHER | Age: 39
End: 2019-03-04

## 2019-03-04 NOTE — TELEPHONE ENCOUNTER
Action Requested: Summary for Provider     []  Critical Lab, Recommendations Needed  [] Need Additional Advice  []   FYI    []   Need Orders  [] Need Medications Sent to Pharmacy  []  Other     SUMMARY: OV scheduled with Dr CATHERINE Lowry 3/7/19 for further e

## 2019-03-07 ENCOUNTER — OFFICE VISIT (OUTPATIENT)
Dept: FAMILY MEDICINE CLINIC | Facility: CLINIC | Age: 39
End: 2019-03-07
Payer: COMMERCIAL

## 2019-03-07 VITALS
TEMPERATURE: 98 F | SYSTOLIC BLOOD PRESSURE: 143 MMHG | HEART RATE: 84 BPM | BODY MASS INDEX: 25 KG/M2 | WEIGHT: 174 LBS | DIASTOLIC BLOOD PRESSURE: 89 MMHG

## 2019-03-07 DIAGNOSIS — I10 ESSENTIAL HYPERTENSION: ICD-10-CM

## 2019-03-07 DIAGNOSIS — N62 GYNECOMASTIA: ICD-10-CM

## 2019-03-07 DIAGNOSIS — N18.4 CHRONIC KIDNEY DISEASE, STAGE IV (SEVERE) (HCC): Primary | ICD-10-CM

## 2019-03-07 PROCEDURE — 99212 OFFICE O/P EST SF 10 MIN: CPT | Performed by: FAMILY MEDICINE

## 2019-03-07 PROCEDURE — 99213 OFFICE O/P EST LOW 20 MIN: CPT | Performed by: FAMILY MEDICINE

## 2019-03-07 RX ORDER — CITALOPRAM 10 MG/1
10 TABLET ORAL DAILY
Qty: 90 TABLET | Refills: 1 | Status: SHIPPED | OUTPATIENT
Start: 2019-03-07 | End: 2019-06-10

## 2019-03-07 RX ORDER — OMEPRAZOLE 10 MG/1
CAPSULE, DELAYED RELEASE ORAL
Refills: 6 | COMMUNITY
Start: 2018-12-02 | End: 2019-06-10

## 2019-03-07 RX ORDER — LIDOCAINE AND PRILOCAINE 25; 25 MG/G; MG/G
CREAM TOPICAL
Refills: 3 | COMMUNITY
Start: 2018-10-03 | End: 2021-11-23

## 2019-03-07 NOTE — PROGRESS NOTES
Has breast bud  Pain left breast  Patient states is been occurring for the last few months he noticed it coinciding to the clonidine patch. He held the clonidine patch for a few days and noticed the breast blood tenderness was decreased.   Even when he [place]

## 2019-03-11 ENCOUNTER — TELEPHONE (OUTPATIENT)
Dept: FAMILY MEDICINE CLINIC | Facility: CLINIC | Age: 39
End: 2019-03-11

## 2019-03-11 DIAGNOSIS — N62 GYNECOMASTIA: Primary | ICD-10-CM

## 2019-03-11 NOTE — TELEPHONE ENCOUNTER
Corinna/ [address] [facility] [employer] stt pt needs a MAMMO order. So she is requesting a order to be put on pt chart.  Pt need the mammo done before the ultrasound

## 2019-03-20 ENCOUNTER — HOSPITAL ENCOUNTER (OUTPATIENT)
Dept: MAMMOGRAPHY | Facility: HOSPITAL | Age: 39
Discharge: HOME | End: 2019-03-20
Attending: FAMILY MEDICINE
Payer: COMMERCIAL

## 2019-03-20 DIAGNOSIS — N62 GYNECOMASTIA: ICD-10-CM

## 2019-03-20 PROCEDURE — 77062 BREAST TOMOSYNTHESIS BI: CPT | Performed by: FAMILY MEDICINE

## 2019-03-20 PROCEDURE — 77066 DX MAMMO INCL CAD BI: CPT | Performed by: FAMILY MEDICINE

## 2019-03-22 ENCOUNTER — TELEPHONE (OUTPATIENT)
Dept: OTHER | Age: 39
End: 2019-03-22

## 2019-03-22 NOTE — TELEPHONE ENCOUNTER
Advised patient of Dr. Fredo Méndez note. Patient verbalized understanding. Patient indicated that he stopped the clonidine on his own but did notify the other provider, which is prescribing a different medication for him.  Patient wanted to know if there is

## 2019-03-25 ENCOUNTER — TELEPHONE (OUTPATIENT)
Dept: FAMILY MEDICINE CLINIC | Facility: CLINIC | Age: 39
End: 2019-03-25

## 2019-03-25 NOTE — TELEPHONE ENCOUNTER
----- Message from Tan Fontaine DO sent at 3/21/2019 12:30 PM CDT -----  Patient is visually impaired. Please call if possible. Let him know that the mammogram was normal, except for the gynecomastia.   Hopefully he is discussed with his other physi
LMTCB-ext [id number] today only
See 3/22/19 telephone encounter. Results already discussed with patient. Advised patient to disregard message.
breast and formula  feeding…

## 2019-05-21 ENCOUNTER — TELEPHONE (OUTPATIENT)
Dept: ADMINISTRATIVE | Age: 39
End: 2019-05-21

## 2019-05-21 RX ORDER — CITALOPRAM 10 MG/1
TABLET ORAL
Qty: 90 TABLET | Refills: 0 | OUTPATIENT
Start: 2019-05-21

## 2019-05-21 NOTE — TELEPHONE ENCOUNTER
[facility] [address] [place] [address] ANJALIOSMAR NICHOL AT [place], [phone], [phone]   Outpatient Medication Detail      Disp Refills Start End    Citalopram Hydrobromide 10 MG Oral Tab 90 tablet 1 3/7/2019     Sig - Route

## 2019-05-22 NOTE — TELEPHONE ENCOUNTER
FMLA form for Dr. Rosanne Moraes received in Forms dept. Logged for processing. [employer]ICK[employer] packet emailed to [email or web address] NET [place]. Silvia Cheadle

## 2019-05-28 NOTE — TELEPHONE ENCOUNTER
[employer], they did confirm having a refill for 90 days on file for pt, they will fill today. Patient advised of script at pharmacy, ready today for , no other questions at this time.

## 2019-06-07 NOTE — TELEPHONE ENCOUNTER
Spoke to pt and he will fill out a new HIPAA release and pay for forms completion ($25) at the 6/10/19 visit.

## 2019-06-07 NOTE — TELEPHONE ENCOUNTER
Dr. Yoni Faith for spouse -same as last year - 1-3 days per week to take to dialysis and assist at home for 12 months. Please sign off on form:  -Highlight the patient and hit \"Chart\" button.   -In Chart Review, w/in the Encounter tab - click 1

## 2019-06-10 ENCOUNTER — OFFICE VISIT (OUTPATIENT)
Dept: FAMILY MEDICINE CLINIC | Facility: CLINIC | Age: 39
End: 2019-06-10
Payer: COMMERCIAL

## 2019-06-10 VITALS
SYSTOLIC BLOOD PRESSURE: 130 MMHG | BODY MASS INDEX: 24.62 KG/M2 | HEIGHT: 70 IN | HEART RATE: 102 BPM | WEIGHT: 172 LBS | RESPIRATION RATE: 16 BRPM | DIASTOLIC BLOOD PRESSURE: 82 MMHG

## 2019-06-10 DIAGNOSIS — N62 GYNECOMASTIA: Primary | ICD-10-CM

## 2019-06-10 DIAGNOSIS — N18.6 ESRD ON HEMODIALYSIS (HCC): ICD-10-CM

## 2019-06-10 DIAGNOSIS — Z99.2 ESRD ON HEMODIALYSIS (HCC): ICD-10-CM

## 2019-06-10 DIAGNOSIS — F33.1 MODERATE EPISODE OF RECURRENT MAJOR DEPRESSIVE DISORDER (HCC): ICD-10-CM

## 2019-06-10 PROCEDURE — 99212 OFFICE O/P EST SF 10 MIN: CPT | Performed by: FAMILY MEDICINE

## 2019-06-10 PROCEDURE — 99214 OFFICE O/P EST MOD 30 MIN: CPT | Performed by: FAMILY MEDICINE

## 2019-06-10 RX ORDER — BUPROPION HYDROCHLORIDE 150 MG/1
150 TABLET ORAL DAILY
Qty: 90 TABLET | Refills: 1 | Status: SHIPPED | OUTPATIENT
Start: 2019-06-10 | End: 2019-08-26

## 2019-06-10 NOTE — PROGRESS NOTES
On the list at [facility] for kidney donor    Depression   Bad  \"I'm blind I'm on dialysis. \"    Stomach has been ok  Using tagament not omeprazle      Quit smoking 1 1/2 year ago.     gynecomastia   Hurts had for months  Started 7 mo after starting dialysis    E

## 2019-07-05 ENCOUNTER — TELEPHONE (OUTPATIENT)
Dept: FAMILY MEDICINE CLINIC | Facility: CLINIC | Age: 39
End: 2019-07-05

## 2019-07-05 NOTE — TELEPHONE ENCOUNTER
Call transferred by CSS: Pt states was supposed to make 3 week f/u with Aia 16 for gynecomastia to be referred to endo and new bupropion Rx f/u (reports it is working well) but states receptioninist stated next available appt is not until August. Reviewed LOV

## 2019-08-13 ENCOUNTER — OFFICE VISIT (OUTPATIENT)
Dept: ENDOCRINOLOGY CLINIC | Facility: CLINIC | Age: 39
End: 2019-08-13
Payer: COMMERCIAL

## 2019-08-13 VITALS
BODY MASS INDEX: 24 KG/M2 | DIASTOLIC BLOOD PRESSURE: 80 MMHG | WEIGHT: 167 LBS | HEART RATE: 88 BPM | SYSTOLIC BLOOD PRESSURE: 122 MMHG

## 2019-08-13 DIAGNOSIS — N62 GYNECOMASTIA: Primary | ICD-10-CM

## 2019-08-13 PROCEDURE — 99243 OFF/OP CNSLTJ NEW/EST LOW 30: CPT | Performed by: INTERNAL MEDICINE

## 2019-08-13 NOTE — H&P
New Patient Evaluation - History and Physical    CONSULT - Reason for Visit: gynecomastia  Requesting Physician: Dr. Mariam Balderas:  Patient presents with:  Consult: Pt here today due to lump's on chest. Reports they are painful. (PROAIR HFA) 108 (90 Base) MCG/ACT Inhalation Aero Soln Inhale 2 puffs into the lungs every 4 (four) hours as needed for Wheezing.  Disp: 1 Inhaler Rfl: 3       ALLERGIES:    Seasonal                    SOCIAL HISTORY:    Social History    Socioeconomic His anxiety  Hematology/Lymphatics: Negative for: bruising, lower extremity edema  Endocrine: Negative for: polyuria, polydypsia  Skin: Negative for: rash, blister, cellulitis,      PHYSICAL EXAM:    08/13/19  1344   BP: 122/80   Pulse: 88   Weight: 167 lb (75 hemodialysis. 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 Serum testosterone levels are low, and gonadotropins are appropriately elevated; the metabolic clearance of LH is also reduced.  Gynecomastia may occur following sia

## 2019-08-26 ENCOUNTER — TELEPHONE (OUTPATIENT)
Dept: FAMILY MEDICINE CLINIC | Facility: CLINIC | Age: 39
End: 2019-08-26

## 2019-08-26 ENCOUNTER — OFFICE VISIT (OUTPATIENT)
Dept: FAMILY MEDICINE CLINIC | Facility: CLINIC | Age: 39
End: 2019-08-26
Payer: COMMERCIAL

## 2019-08-26 VITALS
WEIGHT: 166 LBS | BODY MASS INDEX: 24 KG/M2 | HEART RATE: 88 BPM | DIASTOLIC BLOOD PRESSURE: 74 MMHG | SYSTOLIC BLOOD PRESSURE: 126 MMHG | TEMPERATURE: 98 F

## 2019-08-26 DIAGNOSIS — Z99.2 ESRD (END STAGE RENAL DISEASE) ON DIALYSIS (HCC): ICD-10-CM

## 2019-08-26 DIAGNOSIS — F41.9 RECURRENT MILD MAJOR DEPRESSIVE DISORDER WITH ANXIETY (HCC): ICD-10-CM

## 2019-08-26 DIAGNOSIS — R11.0 NAUSEA: Primary | ICD-10-CM

## 2019-08-26 DIAGNOSIS — F33.0 RECURRENT MILD MAJOR DEPRESSIVE DISORDER WITH ANXIETY (HCC): ICD-10-CM

## 2019-08-26 DIAGNOSIS — N18.6 ESRD (END STAGE RENAL DISEASE) ON DIALYSIS (HCC): ICD-10-CM

## 2019-08-26 PROCEDURE — 99214 OFFICE O/P EST MOD 30 MIN: CPT | Performed by: FAMILY MEDICINE

## 2019-08-26 RX ORDER — VENLAFAXINE HYDROCHLORIDE 37.5 MG/1
37.5 CAPSULE, EXTENDED RELEASE ORAL DAILY
Qty: 30 CAPSULE | Refills: 1 | Status: SHIPPED | OUTPATIENT
Start: 2019-08-26 | End: 2019-10-21

## 2019-08-26 RX ORDER — SUCROFERRIC OXYHYDROXIDE 500 MG/1
1 TABLET, CHEWABLE ORAL DAILY
Refills: 4 | COMMUNITY
Start: 2019-05-29 | End: 2021-11-23

## 2019-08-26 NOTE — PROGRESS NOTES
No match for a kidneys  [facility]     Had gynecomastia with citalopram  wellbutrin gave him nausea \"Made me feel sick. \"  More irritablity with the wellbutrin. Dress was appropriate.     Speech:  rate normal, volume was appropriate articulation normal, patien

## 2019-08-26 NOTE — TELEPHONE ENCOUNTER
Spoke with patient ( verified) and reports since changing medication to Wellbutrin XL 6/10/19, he has noticed mood swings, vomiting (4 out of 7 days/week), lost 15 lbs. \"I don't like this medication--I've experienced just about every side effect.  I

## 2019-10-22 RX ORDER — VENLAFAXINE HYDROCHLORIDE 37.5 MG/1
CAPSULE, EXTENDED RELEASE ORAL
Qty: 90 CAPSULE | Refills: 1 | Status: SHIPPED | OUTPATIENT
Start: 2019-10-22 | End: 2020-04-06

## 2019-10-23 NOTE — TELEPHONE ENCOUNTER
[facility].     Requested Prescriptions   Pending Prescriptions Disp Refills   • VENLAFAXINE HCL ER 37.5 MG Oral Capsule SR 24 Hr [Pharmacy Med Name: VENLAFAXINE ER 37.5MG CAPSULES] 30 capsule 0     Sig: TAKE 1 CAPSULE(37.5 M

## 2020-03-16 ENCOUNTER — NURSE TRIAGE (OUTPATIENT)
Dept: FAMILY MEDICINE CLINIC | Facility: CLINIC | Age: 40
End: 2020-03-16

## 2020-03-16 RX ORDER — ALBUTEROL SULFATE 90 UG/1
2 AEROSOL, METERED RESPIRATORY (INHALATION) EVERY 4 HOURS PRN
Qty: 18 G | Refills: 5 | Status: SHIPPED | OUTPATIENT
Start: 2020-03-16

## 2020-03-16 RX ORDER — CODEINE PHOSPHATE AND GUAIFENESIN 10; 100 MG/5ML; MG/5ML
5 SOLUTION ORAL 3 TIMES DAILY PRN
Qty: 100 ML | Refills: 0 | Status: SHIPPED | OUTPATIENT
Start: 2020-03-16 | End: 2020-03-26

## 2020-03-16 NOTE — TELEPHONE ENCOUNTER
Action Requested: Summary for Provider     []  Critical Lab, Recommendations Needed  [x] Need Additional Advice  []   FYI    []   Need Orders  [x] Need Medications Sent to Pharmacy  []  Other     SUMMARY: Patient requesting treatment for symptoms 5 days no

## 2020-03-16 NOTE — TELEPHONE ENCOUNTER
Called and discussed with patient regarding symptoms as above. Will send Albuterol inhaler and Cheratussin AC to pharmacy. Patient verbalized understanding. Time: 5 minutes.

## 2020-04-06 RX ORDER — VENLAFAXINE HYDROCHLORIDE 37.5 MG/1
CAPSULE, EXTENDED RELEASE ORAL
Qty: 90 CAPSULE | Refills: 1 | Status: SHIPPED | OUTPATIENT
Start: 2020-04-06 | End: 2021-03-17

## 2020-04-06 NOTE — TELEPHONE ENCOUNTER
This is being sent to you without review by the Triage staff due to the high call volumes created by the COVID-19 virus, per the email sent by Dr. Stan Betancur on 3/19/20. Thank you for your support.     Centralized Nurse Triage Team

## 2020-04-06 NOTE — TELEPHONE ENCOUNTER
Fax received in [facility] requesting refill.      Current Outpatient Medications   Medication Sig Dispense Refill   • VENLAFAXINE HCL ER 37.5 MG Oral Capsule SR 24 Hr TAKE 1 CAPSULE(37.5 MG) BY MOUTH DAILY 90 capsule 1

## 2020-07-27 ENCOUNTER — PATIENT OUTREACH (OUTPATIENT)
Dept: CASE MANAGEMENT | Age: 40
End: 2020-07-27

## 2020-07-27 NOTE — PROGRESS NOTES
Spoke to pt to intro [facility] services and is currently undecided. Advised pt doesn't have to make a decision today and will send info in the mail and will check back at some point. Pt verbalized understanding.

## 2020-11-13 RX ORDER — VENLAFAXINE HYDROCHLORIDE 37.5 MG/1
CAPSULE, EXTENDED RELEASE ORAL
Qty: 90 CAPSULE | Refills: 1 | OUTPATIENT
Start: 2020-11-13

## 2020-11-13 NOTE — TELEPHONE ENCOUNTER
Venlafaxine HCl ER 37.5 MG Oral Capsule SR 24 Hr, TAKE 1 CAPSULE(37.5 MG) BY MOUTH DAILY, Disp: 90 capsule, Rfl: 1    Refill

## 2020-12-30 ENCOUNTER — OFFICE VISIT (OUTPATIENT)
Dept: FAMILY MEDICINE CLINIC | Facility: CLINIC | Age: 40
End: 2020-12-30
Payer: COMMERCIAL

## 2020-12-30 VITALS
BODY MASS INDEX: 24.11 KG/M2 | WEIGHT: 168.38 LBS | DIASTOLIC BLOOD PRESSURE: 90 MMHG | HEART RATE: 102 BPM | TEMPERATURE: 98 F | HEIGHT: 70 IN | SYSTOLIC BLOOD PRESSURE: 156 MMHG

## 2020-12-30 DIAGNOSIS — R11.0 NAUSEA: ICD-10-CM

## 2020-12-30 DIAGNOSIS — N18.6 ESRD (END STAGE RENAL DISEASE) ON DIALYSIS (HCC): ICD-10-CM

## 2020-12-30 DIAGNOSIS — I10 ESSENTIAL HYPERTENSION: Primary | ICD-10-CM

## 2020-12-30 DIAGNOSIS — Z99.2 ANEMIA DUE TO CHRONIC KIDNEY DISEASE, ON CHRONIC DIALYSIS (HCC): ICD-10-CM

## 2020-12-30 DIAGNOSIS — Z99.2 ESRD (END STAGE RENAL DISEASE) ON DIALYSIS (HCC): ICD-10-CM

## 2020-12-30 DIAGNOSIS — D63.1 ANEMIA DUE TO CHRONIC KIDNEY DISEASE, ON CHRONIC DIALYSIS (HCC): ICD-10-CM

## 2020-12-30 DIAGNOSIS — N18.6 ANEMIA DUE TO CHRONIC KIDNEY DISEASE, ON CHRONIC DIALYSIS (HCC): ICD-10-CM

## 2020-12-30 PROCEDURE — 99215 OFFICE O/P EST HI 40 MIN: CPT | Performed by: NURSE PRACTITIONER

## 2020-12-30 PROCEDURE — 3008F BODY MASS INDEX DOCD: CPT | Performed by: NURSE PRACTITIONER

## 2020-12-30 PROCEDURE — 3077F SYST BP >= 140 MM HG: CPT | Performed by: NURSE PRACTITIONER

## 2020-12-30 PROCEDURE — 3080F DIAST BP >= 90 MM HG: CPT | Performed by: NURSE PRACTITIONER

## 2020-12-30 RX ORDER — ONDANSETRON 4 MG/1
4 TABLET, ORALLY DISINTEGRATING ORAL EVERY 8 HOURS PRN
Qty: 30 TABLET | Refills: 0 | Status: SHIPPED | OUTPATIENT
Start: 2020-12-30

## 2020-12-30 RX ORDER — HYDRALAZINE HYDROCHLORIDE 10 MG/1
10 TABLET, FILM COATED ORAL 2 TIMES DAILY
Qty: 60 TABLET | Refills: 0 | Status: SHIPPED | OUTPATIENT
Start: 2020-12-30 | End: 2020-12-30

## 2020-12-30 RX ORDER — HYDRALAZINE HYDROCHLORIDE 10 MG/1
TABLET, FILM COATED ORAL
Qty: 180 TABLET | Refills: 0 | Status: SHIPPED | OUTPATIENT
Start: 2020-12-30 | End: 2021-11-23

## 2020-12-30 NOTE — PROGRESS NOTES
HPI  Pt here for ER 12/27 f/u-was seen for gastroenteritis and dehydration. Needs to re-establish care as he used to see Dr Coby Sweeney. Has h/o bleeding ulcer  Is on renal dialysis; still makes some urine.    Had fistula in left upper arm-has had htn sin Nasal septoplasty   • Other surgical history      turbinate reduction   • Repair of nasal septum  2011    deviated septum, endoscopic nasal surgery       Family History   Problem Relation Age of Onset   • Hypertension Father    • Diabetes Mother    • Canc Fear of current or ex partner: Not on file        Emotionally abused: Not on file        Physically abused: Not on file        Forced sexual activity: Not on file    Other Topics      Concerns:         Service: Not Asked        Blood Transfus No murmur heard. Edema not present. Pulmonary/Chest: Effort normal and breath sounds normal. No respiratory distress. Abdominal: Soft. Bowel sounds are normal. He exhibits no distension. There is no abdominal tenderness.  Musculoskeletal:      Left lori

## 2021-01-01 PROBLEM — F41.9 RECURRENT MILD MAJOR DEPRESSIVE DISORDER WITH ANXIETY: Status: ACTIVE | Noted: 2021-01-01

## 2021-01-01 PROBLEM — F41.9 RECURRENT MILD MAJOR DEPRESSIVE DISORDER WITH ANXIETY (HCC): Status: ACTIVE | Noted: 2021-01-01

## 2021-01-01 PROBLEM — F33.0 RECURRENT MILD MAJOR DEPRESSIVE DISORDER WITH ANXIETY: Status: ACTIVE | Noted: 2021-01-01

## 2021-01-01 PROBLEM — F41.9 RECURRENT MILD MAJOR DEPRESSIVE DISORDER WITH ANXIETY  (HCC): Status: ACTIVE | Noted: 2021-01-01

## 2021-01-01 PROBLEM — F33.0 RECURRENT MILD MAJOR DEPRESSIVE DISORDER WITH ANXIETY (HCC): Status: ACTIVE | Noted: 2021-01-01

## 2021-01-01 PROBLEM — F33.0 RECURRENT MILD MAJOR DEPRESSIVE DISORDER WITH ANXIETY  (HCC): Status: ACTIVE | Noted: 2021-01-01

## 2021-03-08 ENCOUNTER — PATIENT OUTREACH (OUTPATIENT)
Dept: CASE MANAGEMENT | Age: 41
End: 2021-03-08

## 2021-03-08 NOTE — PROGRESS NOTES
Attempted to call patient for CCM services however patient is ineligible for CCM services. Patient is not a medicare dietz.

## 2021-03-17 RX ORDER — VENLAFAXINE HYDROCHLORIDE 37.5 MG/1
CAPSULE, EXTENDED RELEASE ORAL
Qty: 90 CAPSULE | Refills: 1 | Status: SHIPPED | OUTPATIENT
Start: 2021-03-17 | End: 2021-09-19

## 2021-03-17 NOTE — TELEPHONE ENCOUNTER
Refill request on the following.     Current Outpatient Medications   Medication Sig Dispense Refill   •       •       • Venlafaxine HCl ER 37.5 MG Oral Capsule SR 24 Hr TAKE 1 CAPSULE(37.5 MG) BY MOUTH DAILY 90 capsule 1

## 2021-04-07 ENCOUNTER — PATIENT OUTREACH (OUTPATIENT)
Dept: CASE MANAGEMENT | Age: 41
End: 2021-04-07

## 2021-04-07 NOTE — PROGRESS NOTES
Spoke with patient regarding CCM services patient stated he is not interested at this time. Patient did requested CCM information to mailed for future reference. CCM letter mailed.

## 2021-09-19 RX ORDER — VENLAFAXINE HYDROCHLORIDE 37.5 MG/1
CAPSULE, EXTENDED RELEASE ORAL
Qty: 30 CAPSULE | Refills: 0 | Status: SHIPPED | OUTPATIENT
Start: 2021-09-19 | End: 2023-11-04

## 2021-09-24 NOTE — TELEPHONE ENCOUNTER
Spoke to Patient. He will call back when he can look at his schedule, he was at his son's football game.

## 2021-11-23 ENCOUNTER — OFFICE VISIT (OUTPATIENT)
Dept: FAMILY MEDICINE CLINIC | Facility: CLINIC | Age: 41
End: 2021-11-23
Payer: MEDICARE

## 2021-11-23 VITALS
SYSTOLIC BLOOD PRESSURE: 116 MMHG | WEIGHT: 185 LBS | HEIGHT: 70 IN | BODY MASS INDEX: 26.48 KG/M2 | DIASTOLIC BLOOD PRESSURE: 78 MMHG

## 2021-11-23 DIAGNOSIS — N18.6 ESRD (END STAGE RENAL DISEASE) ON DIALYSIS (HCC): ICD-10-CM

## 2021-11-23 DIAGNOSIS — Z94.0 KIDNEY TRANSPLANTED: ICD-10-CM

## 2021-11-23 DIAGNOSIS — H54.3 VISION LOSS, BILATERAL: ICD-10-CM

## 2021-11-23 DIAGNOSIS — I10 ESSENTIAL HYPERTENSION: ICD-10-CM

## 2021-11-23 DIAGNOSIS — F33.0 RECURRENT MILD MAJOR DEPRESSIVE DISORDER WITH ANXIETY (HCC): ICD-10-CM

## 2021-11-23 DIAGNOSIS — Z00.00 ENCOUNTER FOR ANNUAL HEALTH EXAMINATION: ICD-10-CM

## 2021-11-23 DIAGNOSIS — Z99.2 ESRD (END STAGE RENAL DISEASE) ON DIALYSIS (HCC): ICD-10-CM

## 2021-11-23 DIAGNOSIS — N18.6 ANEMIA DUE TO CHRONIC KIDNEY DISEASE, ON CHRONIC DIALYSIS (HCC): ICD-10-CM

## 2021-11-23 DIAGNOSIS — F41.9 RECURRENT MILD MAJOR DEPRESSIVE DISORDER WITH ANXIETY (HCC): ICD-10-CM

## 2021-11-23 DIAGNOSIS — D63.1 ANEMIA DUE TO CHRONIC KIDNEY DISEASE, ON CHRONIC DIALYSIS (HCC): ICD-10-CM

## 2021-11-23 DIAGNOSIS — Z99.2 ANEMIA DUE TO CHRONIC KIDNEY DISEASE, ON CHRONIC DIALYSIS (HCC): ICD-10-CM

## 2021-11-23 DIAGNOSIS — H46.9 OPTIC NEURITIS: ICD-10-CM

## 2021-11-23 DIAGNOSIS — Z00.00 ROUTINE PHYSICAL EXAMINATION: Primary | ICD-10-CM

## 2021-11-23 PROCEDURE — G0438 PPPS, INITIAL VISIT: HCPCS | Performed by: FAMILY MEDICINE

## 2021-11-23 RX ORDER — MYCOPHENOLIC ACID 360 MG/1
720 TABLET, DELAYED RELEASE ORAL 2 TIMES DAILY
COMMUNITY
Start: 2021-07-22 | End: 2022-07-22

## 2021-11-23 RX ORDER — LISINOPRIL 20 MG/1
1 TABLET ORAL 2 TIMES DAILY
COMMUNITY
Start: 2020-03-22 | End: 2021-11-23

## 2021-11-23 RX ORDER — OLMESARTAN MEDOXOMIL 20 MG/1
1 TABLET ORAL
COMMUNITY
Start: 2021-02-19 | End: 2021-11-23

## 2021-11-23 RX ORDER — CARVEDILOL 6.25 MG/1
TABLET ORAL
COMMUNITY
Start: 2021-09-30

## 2021-11-23 RX ORDER — SULFAMETHOXAZOLE AND TRIMETHOPRIM 400; 80 MG/1; MG/1
1 TABLET ORAL 2 TIMES DAILY
COMMUNITY

## 2021-11-23 RX ORDER — VALGANCICLOVIR 450 MG/1
450 TABLET, FILM COATED ORAL DAILY
COMMUNITY

## 2021-11-23 RX ORDER — MELATONIN
800 2 TIMES DAILY
COMMUNITY
Start: 2021-05-20

## 2021-11-23 RX ORDER — TOBRAMYCIN AND DEXAMETHASONE 3; 1 MG/ML; MG/ML
SUSPENSION/ DROPS OPHTHALMIC
COMMUNITY
Start: 2021-10-14

## 2021-11-23 RX ORDER — TACROLIMUS 1 MG/1
TABLET, EXTENDED RELEASE ORAL
COMMUNITY
Start: 2021-08-05

## 2021-11-23 RX ORDER — PANTOPRAZOLE SODIUM 40 MG/1
1 TABLET, DELAYED RELEASE ORAL
COMMUNITY
Start: 2020-01-30

## 2021-11-23 RX ORDER — DORZOLAMIDE HCL 20 MG/ML
1 SOLUTION/ DROPS OPHTHALMIC 2 TIMES DAILY
COMMUNITY
Start: 2021-08-21

## 2021-11-23 RX ORDER — LATANOPROST 50 UG/ML
1 SOLUTION/ DROPS OPHTHALMIC
COMMUNITY
Start: 2021-08-21

## 2021-11-23 RX ORDER — PREDNISONE 1 MG/1
5 TABLET ORAL DAILY
COMMUNITY

## 2021-11-23 NOTE — PROGRESS NOTES
Cognitive Assessment     What day of the week is this?: Correct  What month is it?: Correct  What year is it?: Correct  Recall \"Ball\": Correct   Recall \"Flag\": Correct   Recall \"Tree\": Correct      Hearing Screening    Time taken: 11/23/2021  2:43 PM

## 2021-11-23 NOTE — PROGRESS NOTES
HPI:   Ryan Fletcher is a 42year old male who presents for a Medicare Initial Annual Wellness visit (Once after 12 month Medicare anniversary) . Patient is legally blind has had kidney transplant this past year.   [facility] and discussion of advance directives standard forms performed Face to Face with patient and Family/surrogate (if present), and forms available to patient in AVS     He does NOT have a Power of  for [employer] on file in [address] [place] [address] Rd.    Advance care Scotty Veras to grass extracts [gramineae pollens], seasonal, and clonidine. CURRENT MEDICATIONS:   carvedilol 6.25 MG Oral Tab,   Dorzolamide HCl 2 % Ophthalmic Solution, Place 1 drop into both eyes 2 (two) times daily.   latanoprost 0.005 % Ophthalmic Solution, 1 d Alcohol and Other Disorders Associated in his paternal grandfather and sister; Cancer in his mother; Diabetes in his mother and paternal grandfather; Hearing deficiency in an other family member; Hypertension in his father; Kidney Disease in his sister; Ps normal, no drainage or sinus tenderness   Throat: Lips, mucosa, and tongue normal; teeth and gums normal   Neck: Supple, symmetrical, trachea midline, no adenopathy, thyroid: not enlarged, symmetric, no tenderness/mass/nodules, no carotid bruit or JVD   Ba DO Essence, 11/23/2021     General Health     In the past six months, have you lost more than 10 pounds without trying?: 2 - No  Has your appetite been poor?: No  How does the patient maintain a good energy level?: Stretching  How would you describe your previous colonoscopy was abnormal -    No recommendations at this time    Flexible Sigmoidoscopy   Covered every 4 years -    Fecal Occult Blood Test Covered annually -   Prostate Cancer Screening    Prostate-Specific Antigen (PSA) Annually No results foun

## 2021-11-23 NOTE — PATIENT INSTRUCTIONS
Aj Oneill's SCREENING SCHEDULE   Tests on this list are recommended by your physician but may not be covered, or covered at this frequency, by your insurer. Please check with your insurance carrier before scheduling to verify coverage.    PREV Pneumococcal Each vaccine ([id number] & [id number]) covered once after 65 Prevnar 13: -    [id number]: -     Pneumococcal Vaccination(2 of 4 - PCV13) due on 05/10/2019    Hepatitis B One screening covered for patients with certain risk factors   -  No

## 2022-01-11 ENCOUNTER — TELEPHONE (OUTPATIENT)
Dept: FAMILY MEDICINE CLINIC | Facility: CLINIC | Age: 42
End: 2022-01-11

## 2022-01-13 ENCOUNTER — OFFICE VISIT (OUTPATIENT)
Dept: FAMILY MEDICINE CLINIC | Facility: CLINIC | Age: 42
End: 2022-01-13
Payer: MEDICARE

## 2022-01-13 ENCOUNTER — TELEPHONE (OUTPATIENT)
Dept: FAMILY MEDICINE CLINIC | Facility: CLINIC | Age: 42
End: 2022-01-13

## 2022-01-13 VITALS
DIASTOLIC BLOOD PRESSURE: 70 MMHG | BODY MASS INDEX: 26.92 KG/M2 | SYSTOLIC BLOOD PRESSURE: 100 MMHG | WEIGHT: 188 LBS | HEART RATE: 80 BPM | HEIGHT: 70 IN

## 2022-01-13 DIAGNOSIS — H40.10X0 OPEN-ANGLE GLAUCOMA, UNSPECIFIED GLAUCOMA STAGE, UNSPECIFIED LATERALITY, UNSPECIFIED OPEN-ANGLE GLAUCOMA TYPE: Primary | ICD-10-CM

## 2022-01-13 DIAGNOSIS — N18.6 ANEMIA DUE TO CHRONIC KIDNEY DISEASE, ON CHRONIC DIALYSIS (HCC): ICD-10-CM

## 2022-01-13 DIAGNOSIS — N18.6 ESRD (END STAGE RENAL DISEASE) ON DIALYSIS (HCC): ICD-10-CM

## 2022-01-13 DIAGNOSIS — D63.1 ANEMIA DUE TO CHRONIC KIDNEY DISEASE, ON CHRONIC DIALYSIS (HCC): ICD-10-CM

## 2022-01-13 DIAGNOSIS — I10 ESSENTIAL HYPERTENSION: ICD-10-CM

## 2022-01-13 DIAGNOSIS — Z99.2 ANEMIA DUE TO CHRONIC KIDNEY DISEASE, ON CHRONIC DIALYSIS (HCC): ICD-10-CM

## 2022-01-13 DIAGNOSIS — Z99.2 ESRD (END STAGE RENAL DISEASE) ON DIALYSIS (HCC): ICD-10-CM

## 2022-01-13 PROCEDURE — 99214 OFFICE O/P EST MOD 30 MIN: CPT | Performed by: FAMILY MEDICINE

## 2022-01-13 RX ORDER — ATROPINE SULFATE 10 MG/ML
SOLUTION/ DROPS OPHTHALMIC
COMMUNITY
Start: 2022-01-07

## 2022-01-13 RX ORDER — DIFLUPREDNATE 0.5 MG/ML
1 EMULSION OPHTHALMIC 4 TIMES DAILY
COMMUNITY
Start: 2022-01-07

## 2022-01-13 RX ORDER — ATROPINE SULFATE 10 MG/ML
SOLUTION/ DROPS OPHTHALMIC
COMMUNITY
Start: 2022-01-07 | End: 2022-01-13

## 2022-01-13 RX ORDER — BRIMONIDINE TARTRATE 2 MG/ML
SOLUTION/ DROPS OPHTHALMIC
COMMUNITY
Start: 2022-01-02

## 2022-01-13 NOTE — TELEPHONE ENCOUNTER
[facility] to verify if there's any preoperative testing required. No answer LMTCB,will try again later. See Below.

## 2022-01-13 NOTE — TELEPHONE ENCOUNTER
Catrachita Leone calling from [facility] calling requesting surgical clearance for pts surgery scheduled  1/17. Informed her pt has visit today.    Please send clearance when ready to fax#:[phone]

## 2022-01-13 NOTE — PROGRESS NOTES
REASON FOR VISIT:    Camilla Nolasco is a 42year old male who presents for an [address]     Glaucoma surgery tube shunt planned    Patient Active Problem List:     Optic neuritis     Abnormal finding on MRI of brain     HTN (hypertensio Non-Reactive      Tuberculosis Screen If high risk No components found for: PPDINDURAT       SPECIFIC DISEASE MONITORING Internal Lab or Procedure   No disease specific diagnoses    ALLERGIES:     Grass Extracts [Gra*    UNKNOWN  Seasonal Take 1 tablet (10 mg total) by mouth once daily.  30 tablet 0      MEDICAL INFORMATION:   Past Medical History:   Diagnosis Date   • Allergic rhinitis    • Asthma    • Blind    • Chronic kidney disease (CKD)    • Detached retina, right    • Extrinsic asthma congestion, sinus pain or ST  LUNGS: denies shortness of breath with exertion  CARDIOVASCULAR: denies chest pain on exertion  GI: denies abdominal pain, denies heartburn  : denies nocturia or changes in stream  MUSCULOSKELETAL: denies back pain  NEURO: d Pneumococcal, Zoster, Tetanus, HPV   Influenza: No recommendations at this time  Pneumonia: Pneumococcal Vaccination(2 of 4 - PCV13) due on 05/10/2019  HPV: No recommendations at this time  Tdap: No recommendations at this time  Shingles:      Influenza An

## 2022-01-14 ENCOUNTER — TELEPHONE (OUTPATIENT)
Dept: FAMILY MEDICINE CLINIC | Facility: CLINIC | Age: 42
End: 2022-01-14

## 2022-01-14 NOTE — TELEPHONE ENCOUNTER
Please notify office of [facility] that patient may proceed with surgery at a medically acceptable risk.

## 2022-01-14 NOTE — TELEPHONE ENCOUNTER
Spoke with kajal from surgery scheduling dep in regards to any preoperative testing. States there is no required test unless PCP recommends.

## 2023-02-07 ENCOUNTER — TELEPHONE (OUTPATIENT)
Dept: FAMILY MEDICINE CLINIC | Facility: CLINIC | Age: 43
End: 2023-02-07

## 2023-02-07 ENCOUNTER — OFFICE VISIT (OUTPATIENT)
Dept: FAMILY MEDICINE CLINIC | Facility: CLINIC | Age: 43
End: 2023-02-07

## 2023-02-07 VITALS
DIASTOLIC BLOOD PRESSURE: 70 MMHG | HEART RATE: 76 BPM | WEIGHT: 177 LBS | SYSTOLIC BLOOD PRESSURE: 103 MMHG | HEIGHT: 70 IN | BODY MASS INDEX: 25.34 KG/M2

## 2023-02-07 DIAGNOSIS — N18.6 ESRD ON HEMODIALYSIS (HCC): ICD-10-CM

## 2023-02-07 DIAGNOSIS — Z99.2 ESRD ON HEMODIALYSIS (HCC): ICD-10-CM

## 2023-02-07 DIAGNOSIS — N18.6 ANEMIA DUE TO CHRONIC KIDNEY DISEASE, ON CHRONIC DIALYSIS (HCC): ICD-10-CM

## 2023-02-07 DIAGNOSIS — D63.1 ANEMIA DUE TO CHRONIC KIDNEY DISEASE, ON CHRONIC DIALYSIS (HCC): ICD-10-CM

## 2023-02-07 DIAGNOSIS — Z94.0 KIDNEY TRANSPLANTED: ICD-10-CM

## 2023-02-07 DIAGNOSIS — I10 HYPERTENSION, UNSPECIFIED TYPE: ICD-10-CM

## 2023-02-07 DIAGNOSIS — H40.10X0 OPEN-ANGLE GLAUCOMA, UNSPECIFIED GLAUCOMA STAGE, UNSPECIFIED LATERALITY, UNSPECIFIED OPEN-ANGLE GLAUCOMA TYPE: Primary | ICD-10-CM

## 2023-02-07 DIAGNOSIS — Z99.2 ANEMIA DUE TO CHRONIC KIDNEY DISEASE, ON CHRONIC DIALYSIS (HCC): ICD-10-CM

## 2023-02-07 PROCEDURE — 99214 OFFICE O/P EST MOD 30 MIN: CPT | Performed by: FAMILY MEDICINE

## 2023-02-07 RX ORDER — ERGOCALCIFEROL 1.25 MG/1
50000 CAPSULE ORAL WEEKLY
COMMUNITY
Start: 2022-05-12

## 2023-02-07 RX ORDER — MYCOPHENOLIC ACID 360 MG/1
720 TABLET, DELAYED RELEASE ORAL 2 TIMES DAILY
COMMUNITY
Start: 2022-07-25 | End: 2023-07-25

## 2023-02-07 RX ORDER — AMLODIPINE BESYLATE 5 MG/1
TABLET ORAL
COMMUNITY
Start: 2022-11-22

## 2023-02-07 RX ORDER — ACETAZOLAMIDE 500 MG/1
CAPSULE, EXTENDED RELEASE ORAL
COMMUNITY
Start: 2023-01-05

## 2023-02-07 RX ORDER — ACETAZOLAMIDE 500 MG/1
CAPSULE, EXTENDED RELEASE ORAL
COMMUNITY
Start: 2022-03-15

## 2023-02-07 RX ORDER — NETARSUDIL 0.2 MG/ML
1 SOLUTION/ DROPS OPHTHALMIC; TOPICAL NIGHTLY
COMMUNITY
Start: 2022-12-13

## 2023-02-07 NOTE — TELEPHONE ENCOUNTER
Please notify office of Dr. Mark Leigh at [facility] eye clinic may proceed with surgery at a medically acceptable risk

## 2023-02-07 NOTE — TELEPHONE ENCOUNTER
Bimal Thompson letter faxed to [facility]   [phone]

## 2023-07-27 ENCOUNTER — TELEPHONE (OUTPATIENT)
Dept: FAMILY MEDICINE CLINIC | Facility: CLINIC | Age: 43
End: 2023-07-27

## 2023-11-02 ENCOUNTER — OFFICE VISIT (OUTPATIENT)
Dept: FAMILY MEDICINE CLINIC | Facility: CLINIC | Age: 43
End: 2023-11-02

## 2023-11-02 VITALS
HEART RATE: 65 BPM | SYSTOLIC BLOOD PRESSURE: 112 MMHG | WEIGHT: 158 LBS | BODY MASS INDEX: 22.62 KG/M2 | DIASTOLIC BLOOD PRESSURE: 76 MMHG | HEIGHT: 70 IN

## 2023-11-02 DIAGNOSIS — Z99.2 ESRD ON HEMODIALYSIS (HCC): ICD-10-CM

## 2023-11-02 DIAGNOSIS — N18.6 ESRD ON HEMODIALYSIS (HCC): ICD-10-CM

## 2023-11-02 DIAGNOSIS — Z00.00 ENCOUNTER FOR MEDICARE ANNUAL WELLNESS EXAM: Primary | ICD-10-CM

## 2023-11-02 DIAGNOSIS — Z00.00 ENCOUNTER FOR ANNUAL HEALTH EXAMINATION: ICD-10-CM

## 2023-11-02 DIAGNOSIS — F33.0 RECURRENT MILD MAJOR DEPRESSIVE DISORDER WITH ANXIETY: ICD-10-CM

## 2023-11-02 DIAGNOSIS — Z94.0 KIDNEY TRANSPLANTED: ICD-10-CM

## 2023-11-02 DIAGNOSIS — Z99.2 ANEMIA DUE TO CHRONIC KIDNEY DISEASE, ON CHRONIC DIALYSIS (HCC): ICD-10-CM

## 2023-11-02 DIAGNOSIS — D63.1 ANEMIA DUE TO CHRONIC KIDNEY DISEASE, ON CHRONIC DIALYSIS (HCC): ICD-10-CM

## 2023-11-02 DIAGNOSIS — R73.9 HYPERGLYCEMIA: ICD-10-CM

## 2023-11-02 DIAGNOSIS — I10 HYPERTENSION, UNSPECIFIED TYPE: ICD-10-CM

## 2023-11-02 DIAGNOSIS — N18.6 ANEMIA DUE TO CHRONIC KIDNEY DISEASE, ON CHRONIC DIALYSIS (HCC): ICD-10-CM

## 2023-11-02 DIAGNOSIS — F41.9 RECURRENT MILD MAJOR DEPRESSIVE DISORDER WITH ANXIETY: ICD-10-CM

## 2023-11-02 DIAGNOSIS — H54.3 VISION LOSS, BILATERAL: ICD-10-CM

## 2023-11-02 PROCEDURE — G0439 PPPS, SUBSEQ VISIT: HCPCS | Performed by: FAMILY MEDICINE

## 2023-11-02 RX ORDER — SULFAMETHOXAZOLE AND TRIMETHOPRIM 800; 160 MG/1; MG/1
TABLET ORAL
COMMUNITY
Start: 2023-10-11

## 2023-11-02 RX ORDER — PREDNISONE 10 MG/1
TABLET ORAL
COMMUNITY
Start: 2023-05-16

## 2023-11-02 RX ORDER — MYCOPHENOLIC ACID 360 MG/1
720 TABLET, DELAYED RELEASE ORAL 2 TIMES DAILY
COMMUNITY
Start: 2023-10-12

## 2023-11-02 RX ORDER — TIMOLOL MALEATE 5 MG/ML
1 SOLUTION/ DROPS OPHTHALMIC 2 TIMES DAILY
COMMUNITY
Start: 2023-09-25

## 2023-11-02 RX ORDER — PREDNISOLONE ACETATE 10 MG/ML
SUSPENSION/ DROPS OPHTHALMIC
COMMUNITY
Start: 2023-06-06

## 2023-11-02 RX ORDER — CLOTRIMAZOLE 10 MG/1
10 LOZENGE ORAL; TOPICAL
Qty: 50 TROCHE | Refills: 0 | Status: SHIPPED | OUTPATIENT
Start: 2023-11-02

## 2023-11-02 RX ORDER — ROSUVASTATIN CALCIUM 5 MG/1
5 TABLET, COATED ORAL DAILY
COMMUNITY
Start: 2023-10-02

## 2023-11-04 RX ORDER — VENLAFAXINE HYDROCHLORIDE 37.5 MG/1
37.5 CAPSULE, EXTENDED RELEASE ORAL DAILY
Qty: 90 CAPSULE | Refills: 3 | Status: CANCELLED | OUTPATIENT
Start: 2023-11-04

## 2023-11-04 RX ORDER — VENLAFAXINE HYDROCHLORIDE 37.5 MG/1
CAPSULE, EXTENDED RELEASE ORAL
Qty: 90 CAPSULE | Refills: 0 | Status: SHIPPED | OUTPATIENT
Start: 2023-11-04

## 2023-11-04 NOTE — TELEPHONE ENCOUNTER
Patient is requesting a refill for the medicine below    venlafaxine 37.5 MG Oral Capsule SR 24 Hr     Per patient Dr Yumiko Calle was supposed to phone this in when he saw him last.    Please call patient to let him know when this is phoned in.     Patient is out of medicine

## 2024-01-25 RX ORDER — VENLAFAXINE HYDROCHLORIDE 37.5 MG/1
CAPSULE, EXTENDED RELEASE ORAL
Qty: 90 CAPSULE | Refills: 3 | Status: SHIPPED | OUTPATIENT
Start: 2024-01-25

## 2024-01-25 NOTE — TELEPHONE ENCOUNTER
Refill passed per [facility] protocol.   Requested Prescriptions   Pending Prescriptions Disp Refills    VENLAFAXINE ER 37.5 MG Oral Capsule SR 24 Hr [Pharmacy Med Name: VENLAFAXINE ER 37.5MG CAPSULES] 90 capsule 0     Sig: TAKE 1 CAPSULE(37.5 MG) BY MOUTH DAILY       Psychiatric Non-Scheduled (Anti-Anxiety) Passed - 1/24/2024  2:41 PM        Passed - In person appointment or virtual visit in the past 6 mos or appointment in next 3 mos     Recent Outpatient Visits              2 months ago Encounter for Medicare annual wellness exam    [facility], [facility], [place] Cespedes, David B, DO    Office Visit    11 months ago Open-angle glaucoma, unspecified glaucoma stage, unspecified laterality, unspecified open-angle glaucoma type    [facility], [place] Cespedes, David B, DO    Office Visit    2 years ago Open-angle glaucoma, unspecified glaucoma stage, unspecified laterality, unspecified open-angle glaucoma type    [facility], [place] Cespedes, David B, DO    Office Visit    2 years ago Routine physical examination    [facility], [place] Cespedes, David B, DO    Office Visit    3 years ago Essential hypertension    [facility], Lauren Monteiro, APRN    Office Visit                          Recent Outpatient Visits              2 months ago Encounter for Medicare annual wellness exam    [facility], [facility], [place] Cespedes, David B, DO    Office Visit    11 months ago Open-angle glaucoma, unspecified glaucoma stage, unspecified laterality, unspecified open-angle glaucoma type    [facility], [place] Cespedes, David B, DO    Office Visit    2 years ago Open-angle glaucoma, unspecified glaucoma stage, unspecified laterality, unspecified open-angle glaucoma type    [facility]  [place], [place] Cespedes, David B, DO    Office Visit    2 years ago Routine physical examination    [facility], [facility], [place] Cespedes, David B, DO    Office Visit    3 years ago Essential hypertension    [facility], [facility], Lauren Monteiro, APRN    Office Visit

## 2024-02-07 ENCOUNTER — TELEPHONE (OUTPATIENT)
Dept: FAMILY MEDICINE CLINIC | Facility: CLINIC | Age: 44
End: 2024-02-07

## 2024-02-07 DIAGNOSIS — Z94.0 KIDNEY TRANSPLANTED (HCC): Primary | ICD-10-CM

## 2024-02-07 NOTE — TELEPHONE ENCOUNTER
Patient is requesting referral.     Name of specialist and specialty department : Nephrology   Reason for visit with the specialist: Transplant patient, follow up   Address of the specialist office: n/a  Appointment date: n/a          CSS informed patient the turnaround time for referral is 5-7 business days.  Patient was informed to check their WebNotes account for referral status.

## 2024-09-24 ENCOUNTER — OFFICE VISIT (OUTPATIENT)
Dept: FAMILY MEDICINE CLINIC | Facility: CLINIC | Age: 44
End: 2024-09-24

## 2024-09-24 ENCOUNTER — TELEPHONE (OUTPATIENT)
Dept: FAMILY MEDICINE CLINIC | Facility: CLINIC | Age: 44
End: 2024-09-24

## 2024-09-24 VITALS
DIASTOLIC BLOOD PRESSURE: 78 MMHG | HEART RATE: 80 BPM | WEIGHT: 155 LBS | BODY MASS INDEX: 22.19 KG/M2 | SYSTOLIC BLOOD PRESSURE: 115 MMHG | HEIGHT: 70 IN

## 2024-09-24 DIAGNOSIS — F41.9 RECURRENT MILD MAJOR DEPRESSIVE DISORDER WITH ANXIETY (HCC): ICD-10-CM

## 2024-09-24 DIAGNOSIS — L03.114 CELLULITIS OF LEFT UPPER EXTREMITY: Primary | ICD-10-CM

## 2024-09-24 DIAGNOSIS — F33.0 RECURRENT MILD MAJOR DEPRESSIVE DISORDER WITH ANXIETY (HCC): ICD-10-CM

## 2024-09-24 DIAGNOSIS — H54.3 VISION LOSS, BILATERAL: ICD-10-CM

## 2024-09-24 DIAGNOSIS — I10 HYPERTENSION, UNSPECIFIED TYPE: ICD-10-CM

## 2024-09-24 DIAGNOSIS — Z94.0 KIDNEY TRANSPLANTED (HCC): ICD-10-CM

## 2024-09-24 PROCEDURE — 99496 TRANSJ CARE MGMT HIGH F2F 7D: CPT | Performed by: FAMILY MEDICINE

## 2024-09-24 RX ORDER — SULFAMETHOXAZOLE/TRIMETHOPRIM 800-160 MG
1 TABLET ORAL 2 TIMES DAILY
Qty: 20 TABLET | Refills: 0 | Status: SHIPPED | OUTPATIENT
Start: 2024-09-24 | End: 2024-10-04

## 2024-09-24 NOTE — PROGRESS NOTES
Subjective:   Aj Oneill is a 43 year old male who presents for hospital follow up.   He was discharged from Inpatient hospital[facility] to Home   Admit Date: 09/21/2024  Discharge Date: 09/24/2024  Hospital Discharge Diagnosis: cellulitis     Interactive contact within 2 business days post discharge first initiated on Date:     During the visit, the following was completed:  Obtained and reviewed discharge summary, continuity of care documents, and Hospitalist notes  Reviewed Labs (CBC, CMP)    HPI: Swelling and pain of left arm at fistula site    History/Other:   Current Medications:  Medication Reconciliation:  I am aware of an inpatient discharge within the last 30 days.  The discharge medication list has been reconciled with the patient's current medication list and reviewed by me.  See medication list for additions of new medication, and changes to current doses of medications and discontinued medications.  Outpatient Medications Marked as Taking for the 9/24/24 encounter (Office Visit) with Oskar Lowry, DO   Medication Sig    sulfamethoxazole-trimethoprim DS (BACTRIM DS) 800-160 MG Oral Tab per tablet Take 1 tablet by mouth 2 (two) times daily for 10 days.    venlafaxine ER 37.5 MG Oral Capsule SR 24 Hr TAKE 1 CAPSULE(37.5 MG) BY MOUTH DAILY    rosuvastatin 5 MG Oral Tab Take 1 tablet (5 mg total) by mouth daily.    timolol 0.5 % Ophthalmic Solution Place 1 drop into both eyes 2 (two) times daily.    predniSONE 10 MG Oral Tab     prednisoLONE 1 % Ophthalmic Suspension SHAKE LIQUID AND INSTILL 1 DROP IN BOTH EYES EVERY DAY    mycophenolate sodium 360 MG Oral Tab EC Take 2 tablets (720 mg total) by mouth 2 (two) times daily.    clotrimazole 10 MG Mouth/Throat Gabi Take 1 lozenge (10 mg total) by mouth 5 (five) times daily.    acetaZOLAMIDE  MG Oral Capsule SR 12 Hr TAKE 1 CAPSULE BY MOUTH WITH BREAKFAST AND WITH DINNER    amLODIPine 5 MG Oral Tab     ergocalciferol 1.25 MG  (53523 UT) Oral Cap Take 1 capsule (50,000 Units total) by mouth once a week.    Netarsudil Dimesylate (RHOPRESSA) 0.02 % Ophthalmic Solution Place 1 drop into both eyes nightly.    atropine 1 % Ophthalmic Solution POST-OP: 1 drop in surgery eye BID. Continue as directed.    brimonidine Tartrate 0.2 % Ophthalmic Solution PLACE 1 DROP INTO LEFT EYE EVERY 12 HOURS    carvedilol 6.25 MG Oral Tab     Dorzolamide HCl 2 % Ophthalmic Solution Place 1 drop into both eyes 2 (two) times daily.    latanoprost 0.005 % Ophthalmic Solution 1 drop Before Dinner.    Magnesium Oxide 400 (240 Mg) MG Oral Tab Take 2 tablets (800 mg total) by mouth 2 (two) times daily.    pantoprazole 40 MG Oral Tab EC Take 1 tablet (40 mg total) by mouth.    ENVARSUS XR 1 MG Oral Tablet 24 Hr     valGANciclovir 450 MG Oral Tab Take 1 tablet (450 mg total) by mouth daily.    ondansetron 4 MG Oral Tablet Dispersible Take 1 tablet (4 mg total) by mouth every 8 (eight) hours as needed for Nausea.    Albuterol Sulfate  (90 Base) MCG/ACT Inhalation Aero Soln Inhale 2 puffs into the lungs every 4 (four) hours as needed for Wheezing or Shortness of Breath.    AmLODIPine Besylate 10 MG Oral Tab Take 1 tablet (10 mg total) by mouth once daily.       Review of Systems  GENERAL: feels well otherwise  SKIN: denies any unusual skin lesions  EYES: denies blurred vision or double vision  HEENT: denies nasal congestion, sinus pain or ST  LUNGS: denies shortness of breath with exertion  CARDIOVASCULAR: denies chest pain on exertion  GI: denies abdominal pain, denies heartburn  : 1 per night nocturia, no complaint of urinary incontinence  MUSCULOSKELETAL: denies back pain  NEURO: denies headaches  PSYCHE: denies depression or anxiety  HEMATOLOGIC: denies hx of anemia  ENDOCRINE: denies thyroid history  ALL/ASTHMA: denies hx of allergy or asthma    Objective:   Physical Exam    Neck supple no carotid bruits heart regular rate rhythm no murmurs    Lungs clear to  auscultation no rales rhonchi or wheezes    Lower extremities no edema    Left arm with obviously swollen and pink fistula noted      /78   Pulse 80   Ht 5' 10\" (1.778 m)   Wt 155 lb (70.3 kg)   BMI 22.24 kg/m²  Estimated body mass index is 22.24 kg/m² as calculated from the following:    Height as of this encounter: 5' 10\" (1.778 m).    Weight as of this encounter: 155 lb (70.3 kg).    Assessment & Plan:   There are no diagnoses linked to this encounter.      No follow-ups on file.   1. Cellulitis of left upper extremity  Bactrim prescription sent contacted office of Dr. Rain to notify    2. Kidney transplanted (HCC)  Continue meds    3. Recurrent mild major depressive disorder with anxiety (HCC)  Did not want to increase venlafaxine denies any suicidal ideation    4. Vision loss, bilateral  Follow-up with ophthalmology    5. Hypertension, unspecified type  Controlled on meds    Left hospital without being formally discharged so antibiotic started today will discuss with vascular surgery

## 2024-10-02 NOTE — TELEPHONE ENCOUNTER
Dr. Lowry,    Patient spouse is requesting intermittent Family Medical Leave Act to help care for patient due to patient different medical issues-vision loss, kidney transplant. Patient spouse is requesting 1-2 days per week, lasts 1-2 days.  1-6 appointments per month, each lasting 1-4 hours . Do you support?    Thank you,  Gloria

## 2024-10-02 NOTE — TELEPHONE ENCOUNTER
Family Medical Leave Act with valid Release of Information received via email in forms dept. Logged for processing.

## 2024-10-02 NOTE — TELEPHONE ENCOUNTER
Called patient to obtain details on spouses forms. Advised patient we would task provider for approval.   Type of Leave: intermittent Family Medical Leave Act   Reason for Leave: vision loss, kidney transplant   Start date of leave: 9/1/24  End date of leave: 9/1/24  How many flare ups per month/length?: 1-2 days per week, lasts 1-2 days.   How many appts per month/length?: 1-6 appointments per month, 1-4 hours   Was Fee and Turnaround info Given?:

## 2024-10-03 NOTE — TELEPHONE ENCOUNTER
Dr. Lowry,    Please sign off on form if you agree to: Family Medical Leave Act (spouse)    -Signature page will be the first page scanned  -From your Inbasket, Highlight the patient and click Chart   -Double click the 9/24/24 Forms Completion telephone encounter  -Scroll down to the Media section   -Click the blue Hyperlink: Family Medical Leave Act Dr. Lowry 10/3/24    -Click Acknowledge located in the top right ribbon/menu   -Drag the mouse into the blank space of the document and a + sign will appear. Left click to   electronically sign the document.  -Once signed, simply exit out of the screen and you signature will be saved.     Thank you,  Gloria

## 2024-10-07 NOTE — TELEPHONE ENCOUNTER
Forms completed and E-faxed to [employer] at [phone], E fax confirmation received. Left voicemail to inform patient

## 2025-01-03 ENCOUNTER — NURSE TRIAGE (OUTPATIENT)
Dept: FAMILY MEDICINE CLINIC | Facility: CLINIC | Age: 45
End: 2025-01-03

## 2025-01-03 NOTE — TELEPHONE ENCOUNTER
Action Requested: Summary for Provider     []  Critical Lab, Recommendations Needed  [] Need Additional Advice  [x]   FYI    []   Need Orders  [] Need Medications Sent to Pharmacy  []  Other     SUMMARY: Patient stated that he fell 2 days ago on the floor onto his left knee. When he hit the floor he did hear a pop in the knee. Patient took tylenol and iced the knee right away. Still has some swelling and pain. Able to walk and extend the knee from a sitting position. Pain level currently 3/10. No other symptoms. Wanted to be seen to have an x-ray. No appointments left for today or tomorrow. Advised patient that he can go to the urgent care in [place] for an evaluation/x-ray on site. Patient agreed.   Reason for call: Fall (Left knee pain/swelling)  Onset: Jan 1, 2025    Reason for Disposition   A 'snap' or 'pop' was heard at the time of injury    Protocols used: Knee Injury-A-[place]

## 2025-01-07 NOTE — TELEPHONE ENCOUNTER
Patient stated that he did not go anywhere for an evaluation. Has been icing the knee. States that the swelling and bruising are gone. Only has minor pain. Patient states that if anything changes will let us know.

## 2025-01-22 ENCOUNTER — TELEPHONE (OUTPATIENT)
Dept: FAMILY MEDICINE CLINIC | Facility: CLINIC | Age: 45
End: 2025-01-22

## 2025-01-27 RX ORDER — VENLAFAXINE HYDROCHLORIDE 37.5 MG/1
37.5 CAPSULE, EXTENDED RELEASE ORAL DAILY
Qty: 90 CAPSULE | Refills: 0 | Status: SHIPPED | OUTPATIENT
Start: 2025-01-27

## 2025-01-27 NOTE — TELEPHONE ENCOUNTER
Please review.  Protocol failed / Has no protocol.    Patient overdue for Physical Exam   Will route to Call Center to make a phone attempt for patient to schedule an office visit with Primary Care Provider .      Requested Prescriptions   Pending Prescriptions Disp Refills    VENLAFAXINE ER 37.5 MG Oral Capsule SR 24 Hr [Pharmacy Med Name: VENLAFAXINE ER 37.5MG CAPSULES] 90 capsule 3     Sig: TAKE 1 CAPSULE(37.5 MG) BY MOUTH DAILY       Psychiatric Non-Scheduled (Anti-Anxiety) Failed - 1/27/2025  3:59 PM        Failed - Depression Screening completed within the past 12 months        Passed - In person appointment or virtual visit in the past 6 mos or appointment in next 3 mos     Recent Outpatient Visits              4 months ago Cellulitis of left upper extremity    [facility], [place] Cespedes, David B, DO    Office Visit    1 year ago Encounter for Medicare annual wellness exam    [facility], [place] Cespedes, David B, DO    Office Visit    1 year ago Open-angle glaucoma, unspecified glaucoma stage, unspecified laterality, unspecified open-angle glaucoma type    [facility], [place] Cespedes, David B, DO    Office Visit    3 years ago Open-angle glaucoma, unspecified glaucoma stage, unspecified laterality, unspecified open-angle glaucoma type    [facility], [place] Cespedes, David B, DO    Office Visit    3 years ago Routine physical examination    [facility], [place] Cespedes, David B, DO    Office Visit                      Passed - Medication is active on med list             Recent Outpatient Visits              4 months ago Cellulitis of left upper extremity    [facility], [place] Cespedes, David B,     Office Visit    1 year ago Encounter for Medicare annual wellness exam    [facility]  [place], [place] Cespedes, David B, DO    Office Visit    1 year ago Open-angle glaucoma, unspecified glaucoma stage, unspecified laterality, unspecified open-angle glaucoma type    [facility], [facility], [place] Cespedes, David B, DO    Office Visit    3 years ago Open-angle glaucoma, unspecified glaucoma stage, unspecified laterality, unspecified open-angle glaucoma type    [facility], [facility], [place] Cespedes, David B, DO    Office Visit    3 years ago Routine physical examination    [facility], [facility], [place] Cespedes, David B, DO    Office Visit

## 2025-02-21 ENCOUNTER — OFFICE VISIT (OUTPATIENT)
Dept: FAMILY MEDICINE CLINIC | Facility: CLINIC | Age: 45
End: 2025-02-21

## 2025-02-21 VITALS
DIASTOLIC BLOOD PRESSURE: 80 MMHG | BODY MASS INDEX: 23.48 KG/M2 | WEIGHT: 164 LBS | HEART RATE: 75 BPM | SYSTOLIC BLOOD PRESSURE: 115 MMHG | HEIGHT: 70 IN

## 2025-02-21 DIAGNOSIS — Z12.11 ENCOUNTER FOR SCREENING COLONOSCOPY: ICD-10-CM

## 2025-02-21 DIAGNOSIS — I10 HYPERTENSION, UNSPECIFIED TYPE: ICD-10-CM

## 2025-02-21 DIAGNOSIS — Z94.0 KIDNEY TRANSPLANTED (HCC): ICD-10-CM

## 2025-02-21 DIAGNOSIS — R73.9 HYPERGLYCEMIA: ICD-10-CM

## 2025-02-21 DIAGNOSIS — M25.562 ACUTE PAIN OF LEFT KNEE: ICD-10-CM

## 2025-02-21 DIAGNOSIS — Z00.00 ROUTINE PHYSICAL EXAMINATION: Primary | ICD-10-CM

## 2025-02-21 PROCEDURE — 90471 IMMUNIZATION ADMIN: CPT | Performed by: FAMILY MEDICINE

## 2025-02-21 PROCEDURE — 90715 TDAP VACCINE 7 YRS/> IM: CPT | Performed by: FAMILY MEDICINE

## 2025-02-21 PROCEDURE — 99396 PREV VISIT EST AGE 40-64: CPT | Performed by: FAMILY MEDICINE

## 2025-02-21 RX ORDER — VARENICLINE TARTRATE 0.5 (11)-1
1 KIT ORAL DAILY
Qty: 1 EACH | Refills: 0 | Status: SHIPPED | OUTPATIENT
Start: 2025-02-21

## 2025-02-21 NOTE — PROGRESS NOTES
REASON FOR VISIT:    Aj Oneill is a 44 year old male who presents for an Annual Health Assessment.    LEFT KNEE PAIN S/P FALL 1/1/2025    Patient Active Problem List   Diagnosis    Optic neuritis    Abnormal finding on MRI of brain    HTN (hypertension)    Anemia due to chronic kidney disease    Gynecomastia    Recurrent mild major depressive disorder with anxiety    Vision loss, bilateral    Kidney transplanted (HCC)     General Health     How would you describe your current health state?: Fair  Type of Diet: Balanced  How do you maintain positive mental well-being?: Social Interaction  How would you describe your daily physical activity?: Light  Have you had any immunizations at another office such as Influenza, Hepatitis B, Tetanus, or Pneumococcal?: No  At any time do you feel concerned for the safety/well-being of yourself and/or your children, in your home or elsewhere?: No     CAGE:     Cut: Have you ever felt you should Cut down on your drinking?: No  Annoyed: Have people Annoyed you by criticizing your drinking?: No  Guilty: Have you ever felt bad or Guilty about your drinking?: No  Eye Opener: Have you ever had a drink first thing in the morning to steady your nerves or to get rid of a hangover (Eye opener)?: No  Scoring  Total Score: 0     Depression Screening (PHQ-2/PHQ-9):  Over the LAST 2 WEEKS             PREVENTATIVE SERVICES  INDICATIONS AND SCHEDULE Recommendation Internal Lab or Procedure   Colonoscopy Screen Every 10 years Health Maintenance   Topic Date Due    Colorectal Cancer Screening  Never done      Flex Sigmoidoscopy Screen  Every 5 years No results found for this or any previous visit.   Fecal Occult Blood  Annually No results found for: \"FOB\", \"OCCULTSTOOL\"   Obesity Screening Screen all adults annually Body mass index is 23.53 kg/m².     Preventive Services for Which Recommendations Vary with Risk Recommendation Internal Lab or Procedure   Cholesterol Screening Recommended  screening varies with age, risk and gender LDL Cholesterol (mg/dL)   Date Value   08/03/2011 125 (H)      Diabetes Screening  If history of high blood pressure or other  risk factors No results found for: \"A1C\"  Glucose (mg/dL)   Date Value   05/02/2018 112 (H)     GLUCOSE (mg/dL)   Date Value   07/06/2013 95     GLUCOSE (P) (mg/dL)   Date Value   09/07/2016 93        Gonorrhea Screening If high risk No results found for: \"GONOCOCCUS\"   HIV Screening For all adults age 18-65, older adults at increased risk No results found for: \"HIV\"   Syphilis Screening Screen if pregnant or high risk No results found for: \"RPR\"   Hepatitis C Screening Screen pts at high risk plus screen one time for adults born 1945 - 1965 HEPATITIS C VIRUS ANTIBODY (S) (no units)   Date Value   09/23/2013 Non-Reactive      Tuberculosis Screen If high risk No components found for: \"PPDINDURAT\"       SPECIFIC DISEASE MONITORING Internal Lab or Procedure   No disease specific diagnoses    ALLERGIES:   Allergies[1]  CURRENT MEDICATIONS:   Current Outpatient Medications   Medication Sig Dispense Refill    Varenicline Tartrate, Starter, (CHANTIX STARTING MONTH PAK) 0.5 MG X 11 & 1 MG X 42 Oral Tablet Therapy Pack Take 1 each by mouth daily. 1 each 0    venlafaxine ER 37.5 MG Oral Capsule SR 24 Hr Take 1 capsule (37.5 mg total) by mouth daily. PATIENT NEEDS FASTING BLOOD TEST FOLLOWED BY APPOINTMENT. 90 capsule 0    predniSONE 10 MG Oral Tab       mycophenolate sodium 360 MG Oral Tab EC Take 2 tablets (720 mg total) by mouth 2 (two) times daily.      amLODIPine 5 MG Oral Tab       ergocalciferol 1.25 MG (70797 UT) Oral Cap Take 1 capsule (50,000 Units total) by mouth once a week.      carvedilol 6.25 MG Oral Tab       Magnesium Oxide 400 (240 Mg) MG Oral Tab Take 2 tablets (800 mg total) by mouth 2 (two) times daily.      pantoprazole 40 MG Oral Tab EC Take 1 tablet (40 mg total) by mouth.      ENVARSUS XR 1 MG Oral Tablet 24 Hr       ondansetron 4 MG  Oral Tablet Dispersible Take 1 tablet (4 mg total) by mouth every 8 (eight) hours as needed for Nausea. 30 tablet 0    Albuterol Sulfate  (90 Base) MCG/ACT Inhalation Aero Soln Inhale 2 puffs into the lungs every 4 (four) hours as needed for Wheezing or Shortness of Breath. 18 g 5      MEDICAL INFORMATION:   Past Medical History:    Allergic rhinitis    Asthma (HCC)    Blind    Chronic kidney disease (CKD)    Detached retina, right    ESRD (end stage renal disease) ([facility])    Extrinsic asthma, unspecified    Hemorrhoids    Kidney disease    elevated creatinine    Optic neuritis    Steroid infusion, left eye steroid infusion 2013    Optic neuritis, left    Optic neuritis, right    Pelvis fracture (HCC)    At age 7    Vision abnormalities      Past Surgical History:   Procedure Laterality Date    Av fistula or graft arterial Left     Eye surgery Bilateral     CATARACT SURGERY /  RETINA DETACHMENT RIGHT / MULTIPLE GLAUCOMA LASER SURGERIES    Other surgical history  2011    Nasal septoplasty    Other surgical history      turbinate reduction    Repair detach retina,scleral buckle Right     Repair of nasal septum  2011    deviated septum, endoscopic nasal surgery    Transplantation of kidney  03/01/2021    WOKE UP AGGRESSIVE AFTER KIDNEY TRANSPLANT      Family History   Problem Relation Age of Onset    Cancer Mother         Cancer-cervical    Diabetes Mother     Retinal detachment Mother         Detached retina    Hypertension Father     Alcohol and Other Disorders Associated Sister     Psychiatric Sister         Psychological    Kidney Disease Sister     Diabetes Paternal Grandfather     Alcohol and Other Disorders Associated Paternal Grandfather         Alcoholism    Other (Hearing deficiency) Other      NO FAMILY HISTORY OF PROSTATE OR COLON CANCER     SOCIAL HISTORY:   Social History     Socioeconomic History    Marital status:    Tobacco Use    Smoking status: Former     Current packs/day: 0.50      Types: Cigarettes    Smokeless tobacco: Never    Tobacco comments:     Has tried to quit in past   Vaping Use    Vaping status: Never Used   Substance and Sexual Activity    Alcohol use: Not Currently     Comment: minimal, beer & liquor socially    Drug use: No   Other Topics Concern    Caffeine Concern Yes     Comment: one cup daily    Exercise No     Comment: 2-3 times/wk     Social Drivers of Health     Food Insecurity: Low Risk  (9/21/2024)    Received from [facility]    Food Insecurity     Have there been times that your food ran out, and you didn't have money to get more?: No     Are there times that you worry that this might happen?: No   Transportation Needs: Low Risk  (9/21/2024)    Received from [facility]    Transportation Needs     Do you have trouble getting transportation to medical appointments?: No   Housing Stability: Low Risk  (9/21/2024)    Received from [facility]    Housing Stability     Are you worried that your electric, gas, oil, or water might be shut off?: No     Are you concerned about having a safe and reliable place to live?: No     Occ:  :       REVIEW OF SYSTEMS:   GENERAL: feels well otherwise  SKIN: denies any unusual skin lesions  EYES: denies blurred vision or double vision  HEENT: denies nasal congestion, sinus pain or ST  LUNGS: denies shortness of breath with exertion  CARDIOVASCULAR: denies chest pain on exertion  GI: denies abdominal pain, denies heartburn  : denies nocturia or changes in stream  MUSCULOSKELETAL: denies back pain  NEURO: denies headaches  PSYCHE: denies depression or anxiety  HEMATOLOGIC: denies hx of anemia  ENDOCRINE: denies thyroid history  ALL/ASTHMA: denies hx of allergy or asthma  NO BLOOD IN STOOL  EXAM:   /80   Pulse 75   Ht 5' 10\" (1.778 m)   Wt 164 lb (74.4 kg)   BMI 23.53 kg/m²   >   BP Readings from Last 3 Encounters:   02/21/25 115/80   09/24/24 115/78   11/02/23  112/76        GENERAL: well developed, well nourished, in no apparent distress   SKIN: no rashes, no suspicious lesions  HEENT: atraumatic, normocephalic, ears and throat are clear  EYES:PERRLA, EOMI, conjunctiva are clear.    NECK: supple, no adenopathy, no bruits  CHEST: no chest tenderness  LUNGS: clear to auscultation  CARDIO: RRR without murmur  GI: good BS's, no masses, HSM or tenderness  : two descended testes, no masses, no hernia, no penile lesions  RECTAL: deferred  MUSCULOSKELETAL: back is not tender, FROM of the back  EXTREMITIES: no cyanosis, clubbing or edema  NEURO: Oriented times three, cranial nerves are intact, motor and sensory are grossly intact         ASSESSMENT AND OTHER RELEVANT CHRONIC CONDITIONS:   Aj Oneill is a 44 year old male who presents for an Annual Health Assessment.     PLAN SUMMARY:   Diagnoses and all orders for this visit:    Routine physical examination    Kidney transplanted (HCC)  -     NEPHROLOGY - INTERNAL    Hypertension, unspecified type    Hyperglycemia    Encounter for screening colonoscopy  -     Gastro Referral - [facility])    Acute pain of left knee  -     ORTHOPEDIC - INTERNAL    Other orders  -     Varenicline Tartrate, Starter, (CHANTIX STARTING MONTH TIERNEY) 0.5 MG X 11 & 1 MG X 42 Oral Tablet Therapy Pack; Take 1 each by mouth daily.  -     TETANUS, DIPHTHERIA TOXOIDS AND ACELLULAR PERTUSIS VACCINE (TDAP), >7 YEARS, IM USE       The patient indicates understanding of these issues and agrees to the plan.  No follow-ups on file.  Tobacco cessation counseling perfomed    SUGGESTED VACCINATIONS - Influenza, Pneumococcal, Zoster, Tetanus, HPV   Influenza: Influenza Vaccine(1) due on 10/01/2024  Pneumonia: Pneumococcal Vaccination(2 of 2 - PCV) due on 05/10/2019  HPV: No recommendations at this time  Tdap: DTaP,Tdap,and Td Vaccines(1 - Tdap) Never done  Shingles:      Influenza Annually   Pneumococcal if high risk   Td/Tdap once then every  10 years   HPV Males 11-21   Zoster (Shingles) 60 and older: one dose   Varicella 2 doses if not immune   MMR 1-2 doses if born after 1957 and not immune     1. Routine physical examination  Tetanus vaccine given patient refused pneumonia, flu or COVID-vaccine     2. Kidney transplanted (HCC)  Likely local nephrology referral given  - NEPHROLOGY - INTERNAL    3. Hypertension, unspecified type  Amlodipine    4. Hyperglycemia  Labs reviewed    5. Encounter for screening colonoscopy  Compliance encouraged  - Gastro Referral - [place])    6. Acute pain of left knee  The patient does have pain going up and down stairs  - ORTHOPEDIC - INTERNAL         [1]   Allergies  Allergen Reactions    Grass Extracts [Gramineae Pollens] UNKNOWN    Seasonal     Wellbutrin [Bupropion] UNKNOWN     AGITATION       Clonidine RASH

## 2025-03-21 NOTE — TELEPHONE ENCOUNTER
Patient is requesting a medication refill     Chantix    [facility] in [place], [place] confirmed preferred

## 2025-03-25 RX ORDER — VARENICLINE TARTRATE 0.5 (11)-1
1 KIT ORAL DAILY
Qty: 1 EACH | Refills: 0 | Status: CANCELLED | OUTPATIENT
Start: 2025-03-25

## 2025-03-25 NOTE — TELEPHONE ENCOUNTER
Please review; protocol failed/ has no protocol        Kyung Vargas days ago     RC  Patient is requesting a medication refill      Chantix     [facility] in [place], [place] confirmed preferred

## 2025-03-26 RX ORDER — VARENICLINE TARTRATE 1 MG/1
1 TABLET, FILM COATED ORAL 2 TIMES DAILY
Qty: 60 TABLET | Refills: 1 | Status: SHIPPED | OUTPATIENT
Start: 2025-03-26 | End: 2025-10-22

## 2025-05-05 RX ORDER — VENLAFAXINE HYDROCHLORIDE 37.5 MG/1
37.5 CAPSULE, EXTENDED RELEASE ORAL DAILY
Qty: 90 CAPSULE | Refills: 3 | Status: SHIPPED | OUTPATIENT
Start: 2025-05-05

## (undated) NOTE — LETTER
No referring provider defined for this encounter. 04/27/18        Patient: Stefano Balloon   YOB: 1980   Date of Visit: 4/27/2018       Dear  Dr. Rand Leslie,      Thank you for referring Stefano Balloon to my practice. uremic symptoms. Patient is hoping that he will get a renal transplantation in the relatively near future but realistically this still could take 2-3 months. Therefore I recommended hospital admission to initiate chronic dialytic therapy.   He wanted me t

## (undated) NOTE — MR AVS SNAPSHOT
[address]  [phone]               Thank you for choosing us for your health care visit with Tashi Landrum MD.  We are glad to serve you and happy to provide you with this summary of your visit. These medications were sent to [place] [address], [facility], [phone], [phone] [address] [facility] [address] [id number]     Phone:  [phone]    - Pantoprazole Sodium 40 MG Tb active are less likely to develop some chronic diseases than adults who are inactive.      HOW TO GET STARTED: HOW TO STAY MOTIVATED:   Start activities slowly and build up over time Do what you like   Get your heart pumping – brisk walking, biking, swimmin

## (undated) NOTE — LETTER
April 7, 2021    [address] [address]    Dear Krystyna Hwang: It was a pleasure speaking with you over the phone recently.  To follow up, I wanted to send you some contact information to utilize when you have a questio

## (undated) NOTE — LETTER
July 27, 2020    [address] [facility]    Dear Fabio Tello: It was a pleasure speaking with you over the phone recently.  To follow up, I wanted to send you some contact information to utilize when you have a question an

## (undated) NOTE — Clinical Note
Thank you for the consult. I saw  Mr. Solitario Sky in the endocrine/diabetes clinic today. Please see attached my note. Please feel free to contact me with any questions. Thanks!

## (undated) NOTE — LETTER
5/8/2018              [address] [place] [facility] [address]         Dear Brittani Cook,    It was a pleasure to see you. Your EKG was normal.  There is no need for further testing at this time.   I look forward to seeing

## (undated) NOTE — LETTER
1/14/2022         Dear to whom it may concern,    Thank you for your referral of Lexi Chin to our [facility] for pre-surgical clearance.     This letter is to confirm that Mr. Lexi Chin has been cleared for the surgery schedu

## (undated) NOTE — LETTER
11/6/2017              [address] [place] [place] [address]         Dear Nilo Iyer,    [facility] records indicate that the blood tests ordered for you by Vira Oneill MD  have not been done and you are due for an appoi

## (undated) NOTE — Clinical Note
No referring provider defined for this encounter. 03/29/2017        Patient: Manolo Lantigua   YOB: 1980   Date of Visit: 3/29/2017       Dear  Dr. Westley Gordillo,      Thank you for referring Manolo Lantigua to my practice. the kidney transplantation process ASAP. Again signs and symptoms of uremia were reviewed in detail. Will initiate dialysis if he is unable to complete the transplantation process in a timely fashion. Strongly advised to follow a low potassium diet.   I

## (undated) NOTE — LETTER
11/14/2019              [address] [place] [address]         Dear Georgette Oakes,    [facility] records indicate that the tests ordered for you by Angeline Mayfield MD  have not been done.   If you have, in fact, already

## (undated) NOTE — LETTER
2/7/2023              [address] [place] [address]         To Whom It May Concern,    Felicia Dixon is currently under my medical care and may proceed with surgery at a medically acceptable risk    Sincerely,      Beverley Zazueta.  DO [facility], [employer], [facility] [address]  [address] [facility]  [phone]